# Patient Record
Sex: FEMALE | Race: WHITE | Employment: OTHER | URBAN - METROPOLITAN AREA
[De-identification: names, ages, dates, MRNs, and addresses within clinical notes are randomized per-mention and may not be internally consistent; named-entity substitution may affect disease eponyms.]

---

## 2018-01-01 ENCOUNTER — HOSPITAL ENCOUNTER (EMERGENCY)
Facility: CLINIC | Age: 78
Discharge: HOME | End: 2018-07-11
Attending: EMERGENCY MEDICINE
Payer: MEDICARE

## 2018-01-01 ENCOUNTER — APPOINTMENT (OUTPATIENT)
Dept: GENERAL RADIOLOGY | Facility: CLINIC | Age: 78
End: 2018-01-01
Payer: MEDICARE

## 2018-01-01 ENCOUNTER — HOSPITAL ENCOUNTER (EMERGENCY)
Facility: CLINIC | Age: 78
Discharge: HOME | DRG: 640 | End: 2018-09-07
Attending: EMERGENCY MEDICINE
Payer: MEDICARE

## 2018-01-01 ENCOUNTER — APPOINTMENT (OUTPATIENT)
Dept: CT IMAGING | Facility: CLINIC | Age: 78
End: 2018-01-01
Payer: MEDICARE

## 2018-01-01 ENCOUNTER — HOSPITAL ENCOUNTER (OUTPATIENT)
Age: 78
Setting detail: SPECIMEN
Discharge: HOME | End: 2018-07-23
Payer: MEDICARE

## 2018-01-01 ENCOUNTER — HOSPITAL ENCOUNTER (OUTPATIENT)
Age: 78
Setting detail: SPECIMEN
Discharge: HOME | End: 2018-06-08
Payer: MEDICARE

## 2018-01-01 ENCOUNTER — APPOINTMENT (OUTPATIENT)
Dept: CT IMAGING | Age: 78
End: 2018-01-01
Payer: MEDICARE

## 2018-01-01 ENCOUNTER — HOSPITAL ENCOUNTER (EMERGENCY)
Age: 78
Discharge: HOME | End: 2018-06-30
Attending: EMERGENCY MEDICINE
Payer: MEDICARE

## 2018-01-01 ENCOUNTER — HOSPITAL ENCOUNTER (INPATIENT)
Age: 78
LOS: 4 days | Discharge: NURSING, REHAB OR LONG-TERM CARE | DRG: 640 | End: 2018-09-12
Admitting: FAMILY MEDICINE
Payer: MEDICARE

## 2018-01-01 VITALS
HEART RATE: 57 BPM | OXYGEN SATURATION: 96 % | DIASTOLIC BLOOD PRESSURE: 95 MMHG | WEIGHT: 133 LBS | HEIGHT: 67 IN | TEMPERATURE: 98.8 F | BODY MASS INDEX: 20.88 KG/M2 | SYSTOLIC BLOOD PRESSURE: 187 MMHG | RESPIRATION RATE: 22 BRPM

## 2018-01-01 VITALS
DIASTOLIC BLOOD PRESSURE: 91 MMHG | WEIGHT: 130 LBS | BODY MASS INDEX: 20.4 KG/M2 | SYSTOLIC BLOOD PRESSURE: 164 MMHG | HEIGHT: 67 IN | OXYGEN SATURATION: 99 % | RESPIRATION RATE: 22 BRPM | HEART RATE: 62 BPM | TEMPERATURE: 98 F

## 2018-01-01 VITALS
TEMPERATURE: 98.1 F | SYSTOLIC BLOOD PRESSURE: 155 MMHG | WEIGHT: 120.9 LBS | OXYGEN SATURATION: 98 % | HEIGHT: 67 IN | RESPIRATION RATE: 14 BRPM | HEART RATE: 73 BPM | BODY MASS INDEX: 18.98 KG/M2 | DIASTOLIC BLOOD PRESSURE: 81 MMHG

## 2018-01-01 VITALS
RESPIRATION RATE: 16 BRPM | WEIGHT: 130 LBS | OXYGEN SATURATION: 95 % | HEART RATE: 64 BPM | BODY MASS INDEX: 20.4 KG/M2 | DIASTOLIC BLOOD PRESSURE: 91 MMHG | TEMPERATURE: 98.2 F | SYSTOLIC BLOOD PRESSURE: 154 MMHG | HEIGHT: 67 IN

## 2018-01-01 DIAGNOSIS — E86.0 DEHYDRATION: Primary | ICD-10-CM

## 2018-01-01 DIAGNOSIS — T73.3XXA FATIGUE DUE TO EXCESSIVE EXERTION, INITIAL ENCOUNTER: Primary | ICD-10-CM

## 2018-01-01 DIAGNOSIS — R53.1 GENERAL WEAKNESS: ICD-10-CM

## 2018-01-01 DIAGNOSIS — E86.0 DEHYDRATION: ICD-10-CM

## 2018-01-01 DIAGNOSIS — N34.2 URETHRITIS: ICD-10-CM

## 2018-01-01 DIAGNOSIS — R41.82 ALTERED MENTAL STATUS, UNSPECIFIED ALTERED MENTAL STATUS TYPE: Primary | ICD-10-CM

## 2018-01-01 DIAGNOSIS — N39.0 URINARY TRACT INFECTION WITHOUT HEMATURIA, SITE UNSPECIFIED: Primary | ICD-10-CM

## 2018-01-01 LAB
-: ABNORMAL
-: ABNORMAL
ABSOLUTE EOS #: 0.2 K/UL (ref 0–0.4)
ABSOLUTE EOS #: 0.3 K/UL (ref 0–0.4)
ABSOLUTE EOS #: 0.3 K/UL (ref 0–0.4)
ABSOLUTE EOS #: 0.4 K/UL (ref 0–0.4)
ABSOLUTE EOS #: 0.4 K/UL (ref 0–0.4)
ABSOLUTE IMMATURE GRANULOCYTE: ABNORMAL K/UL (ref 0–0.3)
ABSOLUTE LYMPH #: 0.7 K/UL (ref 1–4.8)
ABSOLUTE LYMPH #: 1 K/UL (ref 1–4.8)
ABSOLUTE LYMPH #: 1 K/UL (ref 1–4.8)
ABSOLUTE LYMPH #: 1.1 K/UL (ref 1–4.8)
ABSOLUTE LYMPH #: 1.3 K/UL (ref 1–4.8)
ABSOLUTE MONO #: 0.3 K/UL (ref 0.1–1.2)
ABSOLUTE MONO #: 0.3 K/UL (ref 0.1–1.2)
ABSOLUTE MONO #: 0.3 K/UL (ref 0.2–0.8)
ABSOLUTE MONO #: 0.3 K/UL (ref 0.2–0.8)
ABSOLUTE MONO #: 0.4 K/UL (ref 0.2–0.8)
AMMONIA: 57 UMOL/L (ref 11–51)
AMORPHOUS: ABNORMAL
AMORPHOUS: ABNORMAL
AMPHETAMINE SCREEN URINE: NEGATIVE
ANION GAP SERPL CALCULATED.3IONS-SCNC: 10 MMOL/L (ref 9–17)
ANION GAP SERPL CALCULATED.3IONS-SCNC: 12 MMOL/L (ref 9–17)
ANION GAP SERPL CALCULATED.3IONS-SCNC: 13 MMOL/L (ref 9–17)
ANION GAP SERPL CALCULATED.3IONS-SCNC: 14 MMOL/L (ref 9–17)
ANION GAP SERPL CALCULATED.3IONS-SCNC: 14 MMOL/L (ref 9–17)
BACTERIA: ABNORMAL
BACTERIA: ABNORMAL
BARBITURATE SCREEN URINE: NEGATIVE
BASOPHILS # BLD: 1 % (ref 0–2)
BASOPHILS ABSOLUTE: 0 K/UL (ref 0–0.2)
BASOPHILS ABSOLUTE: 0.1 K/UL (ref 0–0.2)
BENZODIAZEPINE SCREEN, URINE: NEGATIVE
BILIRUBIN URINE: NEGATIVE
BUN BLDV-MCNC: 11 MG/DL (ref 8–23)
BUN BLDV-MCNC: 17 MG/DL (ref 8–23)
BUN BLDV-MCNC: 21 MG/DL (ref 8–23)
BUN BLDV-MCNC: 22 MG/DL (ref 8–23)
BUN BLDV-MCNC: 22 MG/DL (ref 8–23)
BUN BLDV-MCNC: 30 MG/DL (ref 8–23)
BUN BLDV-MCNC: 31 MG/DL (ref 8–23)
BUN/CREAT BLD: 10 (ref 9–20)
BUN/CREAT BLD: 16 (ref 9–20)
BUN/CREAT BLD: 18 (ref 9–20)
BUN/CREAT BLD: 20 (ref 9–20)
BUN/CREAT BLD: ABNORMAL (ref 9–20)
BUPRENORPHINE URINE: NORMAL
CALCIUM SERPL-MCNC: 9 MG/DL (ref 8.6–10.4)
CALCIUM SERPL-MCNC: 9.1 MG/DL (ref 8.6–10.4)
CALCIUM SERPL-MCNC: 9.2 MG/DL (ref 8.6–10.4)
CALCIUM SERPL-MCNC: 9.3 MG/DL (ref 8.6–10.4)
CALCIUM SERPL-MCNC: 9.3 MG/DL (ref 8.6–10.4)
CALCIUM SERPL-MCNC: 9.4 MG/DL (ref 8.6–10.4)
CALCIUM SERPL-MCNC: 9.6 MG/DL (ref 8.6–10.4)
CANNABINOID SCREEN URINE: NEGATIVE
CASTS UA: ABNORMAL /LPF (ref 0–2)
CASTS UA: ABNORMAL /LPF (ref 0–2)
CHLORIDE BLD-SCNC: 101 MMOL/L (ref 98–107)
CHLORIDE BLD-SCNC: 102 MMOL/L (ref 98–107)
CHLORIDE BLD-SCNC: 103 MMOL/L (ref 98–107)
CHLORIDE BLD-SCNC: 103 MMOL/L (ref 98–107)
CHLORIDE BLD-SCNC: 105 MMOL/L (ref 98–107)
CHLORIDE BLD-SCNC: 98 MMOL/L (ref 98–107)
CHLORIDE BLD-SCNC: 99 MMOL/L (ref 98–107)
CO2: 24 MMOL/L (ref 20–31)
CO2: 25 MMOL/L (ref 20–31)
CO2: 27 MMOL/L (ref 20–31)
CO2: 28 MMOL/L (ref 20–31)
COCAINE METABOLITE, URINE: NEGATIVE
COLOR: YELLOW
COMMENT UA: ABNORMAL
COMMENT UA: ABNORMAL
COMMENT UA: NORMAL
CREAT SERPL-MCNC: 1.07 MG/DL (ref 0.5–0.9)
CREAT SERPL-MCNC: 1.15 MG/DL (ref 0.5–0.9)
CREAT SERPL-MCNC: 1.16 MG/DL (ref 0.5–0.9)
CREAT SERPL-MCNC: 1.24 MG/DL (ref 0.5–0.9)
CREAT SERPL-MCNC: 1.3 MG/DL (ref 0.5–0.9)
CREAT SERPL-MCNC: 1.4 MG/DL (ref 0.5–0.9)
CREAT SERPL-MCNC: 1.47 MG/DL (ref 0.5–0.9)
CRYSTALS, UA: ABNORMAL /HPF
CRYSTALS, UA: ABNORMAL /HPF
CULTURE: NORMAL
DIFFERENTIAL TYPE: ABNORMAL
EKG ATRIAL RATE: 60 BPM
EKG ATRIAL RATE: 65 BPM
EKG P AXIS: 81 DEGREES
EKG P AXIS: 84 DEGREES
EKG P-R INTERVAL: 146 MS
EKG P-R INTERVAL: 150 MS
EKG Q-T INTERVAL: 386 MS
EKG Q-T INTERVAL: 396 MS
EKG QRS DURATION: 78 MS
EKG QRS DURATION: 80 MS
EKG QTC CALCULATION (BAZETT): 396 MS
EKG QTC CALCULATION (BAZETT): 401 MS
EKG R AXIS: 33 DEGREES
EKG R AXIS: 43 DEGREES
EKG T AXIS: 76 DEGREES
EKG T AXIS: 88 DEGREES
EKG VENTRICULAR RATE: 60 BPM
EKG VENTRICULAR RATE: 65 BPM
EOSINOPHILS RELATIVE PERCENT: 5 % (ref 1–4)
EOSINOPHILS RELATIVE PERCENT: 6 % (ref 1–4)
EOSINOPHILS RELATIVE PERCENT: 8 % (ref 1–4)
EPITHELIAL CELLS UA: ABNORMAL /HPF (ref 0–5)
EPITHELIAL CELLS UA: ABNORMAL /HPF (ref 0–5)
ETHANOL PERCENT: <0.01 %
ETHANOL: <10 MG/DL
GFR AFRICAN AMERICAN: 42 ML/MIN
GFR AFRICAN AMERICAN: 44 ML/MIN
GFR AFRICAN AMERICAN: 48 ML/MIN
GFR AFRICAN AMERICAN: 51 ML/MIN
GFR AFRICAN AMERICAN: 55 ML/MIN
GFR AFRICAN AMERICAN: 55 ML/MIN
GFR AFRICAN AMERICAN: >60 ML/MIN
GFR NON-AFRICAN AMERICAN: 34 ML/MIN
GFR NON-AFRICAN AMERICAN: 36 ML/MIN
GFR NON-AFRICAN AMERICAN: 40 ML/MIN
GFR NON-AFRICAN AMERICAN: 42 ML/MIN
GFR NON-AFRICAN AMERICAN: 45 ML/MIN
GFR NON-AFRICAN AMERICAN: 46 ML/MIN
GFR NON-AFRICAN AMERICAN: 50 ML/MIN
GFR SERPL CREATININE-BSD FRML MDRD: ABNORMAL ML/MIN/{1.73_M2}
GLUCOSE BLD-MCNC: 100 MG/DL (ref 70–99)
GLUCOSE BLD-MCNC: 108 MG/DL (ref 70–99)
GLUCOSE BLD-MCNC: 111 MG/DL (ref 65–105)
GLUCOSE BLD-MCNC: 117 MG/DL (ref 70–99)
GLUCOSE BLD-MCNC: 123 MG/DL (ref 70–99)
GLUCOSE BLD-MCNC: 74 MG/DL (ref 70–99)
GLUCOSE BLD-MCNC: 92 MG/DL (ref 70–99)
GLUCOSE BLD-MCNC: 97 MG/DL (ref 70–99)
GLUCOSE URINE: NEGATIVE
HCT VFR BLD CALC: 35.8 % (ref 36–46)
HCT VFR BLD CALC: 35.9 % (ref 36–46)
HCT VFR BLD CALC: 36.7 % (ref 36–46)
HCT VFR BLD CALC: 37.9 % (ref 36–46)
HCT VFR BLD CALC: 38.1 % (ref 36.3–47.1)
HCT VFR BLD CALC: 38.4 % (ref 36–46)
HCT VFR BLD CALC: 38.4 % (ref 36–46)
HEMOGLOBIN: 12.1 G/DL (ref 11.9–15.1)
HEMOGLOBIN: 12.3 G/DL (ref 12–16)
HEMOGLOBIN: 12.5 G/DL (ref 12–16)
HEMOGLOBIN: 12.6 G/DL (ref 12–16)
HEMOGLOBIN: 12.8 G/DL (ref 12–16)
HEMOGLOBIN: 12.9 G/DL (ref 12–16)
HEMOGLOBIN: 12.9 G/DL (ref 12–16)
IMMATURE GRANULOCYTES: ABNORMAL %
INR BLD: 1
KETONES, URINE: ABNORMAL
KETONES, URINE: NEGATIVE
KETONES, URINE: NEGATIVE
LACTIC ACID, SEPSIS WHOLE BLOOD: NORMAL MMOL/L (ref 0.5–1.9)
LACTIC ACID, SEPSIS: 1.1 MMOL/L (ref 0.5–1.9)
LACTIC ACID: 0.8 MMOL/L (ref 0.5–2.2)
LACTIC ACID: 1.2 MMOL/L (ref 0.5–2.2)
LEUKOCYTE ESTERASE, URINE: ABNORMAL
LEUKOCYTE ESTERASE, URINE: NEGATIVE
LEUKOCYTE ESTERASE, URINE: NEGATIVE
LIPASE: 128 U/L (ref 13–60)
LIPASE: 296 U/L (ref 13–60)
LYMPHOCYTES # BLD: 15 % (ref 24–44)
LYMPHOCYTES # BLD: 15 % (ref 24–44)
LYMPHOCYTES # BLD: 18 % (ref 24–44)
LYMPHOCYTES # BLD: 25 % (ref 24–44)
LYMPHOCYTES # BLD: 27 % (ref 24–44)
Lab: NORMAL
MAGNESIUM: 1.8 MG/DL (ref 1.6–2.6)
MAGNESIUM: 1.8 MG/DL (ref 1.6–2.6)
MAGNESIUM: 1.9 MG/DL (ref 1.6–2.6)
MCH RBC QN AUTO: 30 PG (ref 25.2–33.5)
MCH RBC QN AUTO: 31.1 PG (ref 26–34)
MCH RBC QN AUTO: 31.7 PG (ref 26–34)
MCH RBC QN AUTO: 31.9 PG (ref 26–34)
MCH RBC QN AUTO: 32 PG (ref 26–34)
MCH RBC QN AUTO: 32.1 PG (ref 26–34)
MCH RBC QN AUTO: 32.9 PG (ref 26–34)
MCHC RBC AUTO-ENTMCNC: 31.8 G/DL (ref 28.4–34.8)
MCHC RBC AUTO-ENTMCNC: 33.5 G/DL (ref 31–37)
MCHC RBC AUTO-ENTMCNC: 33.6 G/DL (ref 31–37)
MCHC RBC AUTO-ENTMCNC: 33.6 G/DL (ref 31–37)
MCHC RBC AUTO-ENTMCNC: 33.8 G/DL (ref 31–37)
MCHC RBC AUTO-ENTMCNC: 34.8 G/DL (ref 31–37)
MCHC RBC AUTO-ENTMCNC: 35.1 G/DL (ref 31–37)
MCV RBC AUTO: 91.8 FL (ref 80–100)
MCV RBC AUTO: 93.1 FL (ref 80–100)
MCV RBC AUTO: 93.6 FL (ref 80–100)
MCV RBC AUTO: 94.3 FL (ref 80–100)
MCV RBC AUTO: 94.3 FL (ref 82.6–102.9)
MCV RBC AUTO: 94.5 FL (ref 80–100)
MCV RBC AUTO: 95.6 FL (ref 80–100)
MDMA URINE: NORMAL
METHADONE SCREEN, URINE: NEGATIVE
METHAMPHETAMINE, URINE: NORMAL
MONOCYTES # BLD: 6 % (ref 1–7)
MONOCYTES # BLD: 6 % (ref 2–11)
MONOCYTES # BLD: 7 % (ref 1–7)
MONOCYTES # BLD: 7 % (ref 1–7)
MONOCYTES # BLD: 7 % (ref 2–11)
MUCUS: ABNORMAL
MUCUS: ABNORMAL
MYOGLOBIN: 86 NG/ML (ref 25–58)
NITRITE, URINE: NEGATIVE
NRBC AUTOMATED: 0 PER 100 WBC
NRBC AUTOMATED: ABNORMAL PER 100 WBC
OPIATES, URINE: NEGATIVE
OTHER OBSERVATIONS UA: ABNORMAL
OTHER OBSERVATIONS UA: ABNORMAL
OXYCODONE SCREEN URINE: NEGATIVE
PDW BLD-RTO: 13.4 % (ref 11.5–14.5)
PDW BLD-RTO: 13.4 % (ref 11.5–14.5)
PDW BLD-RTO: 13.5 % (ref 11.8–14.4)
PDW BLD-RTO: 13.7 % (ref 12.5–15.4)
PDW BLD-RTO: 14.1 % (ref 12.5–15.4)
PDW BLD-RTO: 14.5 % (ref 11.5–14.5)
PDW BLD-RTO: 14.6 % (ref 11.5–14.5)
PH UA: 5.5 (ref 5–8)
PH UA: 6.5 (ref 5–8)
PH UA: 7.5 (ref 5–8)
PHENCYCLIDINE, URINE: NEGATIVE
PLATELET # BLD: 232 K/UL (ref 140–450)
PLATELET # BLD: 234 K/UL (ref 140–450)
PLATELET # BLD: 238 K/UL (ref 138–453)
PLATELET # BLD: 239 K/UL (ref 130–400)
PLATELET # BLD: 244 K/UL (ref 130–400)
PLATELET # BLD: 246 K/UL (ref 130–400)
PLATELET # BLD: 285 K/UL (ref 130–400)
PLATELET ESTIMATE: ABNORMAL
PMV BLD AUTO: 10.2 FL (ref 8.1–13.5)
PMV BLD AUTO: 7.3 FL (ref 6–12)
PMV BLD AUTO: 7.5 FL (ref 6–12)
PMV BLD AUTO: 7.8 FL (ref 6–12)
PMV BLD AUTO: 8.4 FL (ref 6–12)
PMV BLD AUTO: ABNORMAL FL (ref 6–12)
PMV BLD AUTO: ABNORMAL FL (ref 6–12)
POTASSIUM SERPL-SCNC: 3.5 MMOL/L (ref 3.7–5.3)
POTASSIUM SERPL-SCNC: 3.6 MMOL/L (ref 3.7–5.3)
POTASSIUM SERPL-SCNC: 3.7 MMOL/L (ref 3.7–5.3)
POTASSIUM SERPL-SCNC: 4 MMOL/L (ref 3.7–5.3)
POTASSIUM SERPL-SCNC: 4.1 MMOL/L (ref 3.7–5.3)
POTASSIUM SERPL-SCNC: 4.1 MMOL/L (ref 3.7–5.3)
POTASSIUM SERPL-SCNC: 4.2 MMOL/L (ref 3.7–5.3)
PREALBUMIN: 25.4 MG/DL (ref 20–40)
PROCALCITONIN: 0.07 NG/ML
PROPOXYPHENE, URINE: NORMAL
PROTEIN UA: ABNORMAL
PROTEIN UA: NEGATIVE
PROTEIN UA: NEGATIVE
PROTHROMBIN TIME: 10.6 SEC (ref 9.7–11.6)
RBC # BLD: 3.82 M/UL (ref 4–5.2)
RBC # BLD: 3.84 M/UL (ref 4–5.2)
RBC # BLD: 3.92 M/UL (ref 4–5.2)
RBC # BLD: 4.01 M/UL (ref 4–5.2)
RBC # BLD: 4.04 M/UL (ref 3.95–5.11)
RBC # BLD: 4.07 M/UL (ref 4–5.2)
RBC # BLD: 4.13 M/UL (ref 4–5.2)
RBC # BLD: ABNORMAL 10*6/UL
RBC UA: ABNORMAL /HPF (ref 0–2)
RBC UA: ABNORMAL /HPF (ref 0–2)
RENAL EPITHELIAL, UA: ABNORMAL /HPF
RENAL EPITHELIAL, UA: ABNORMAL /HPF
SEG NEUTROPHILS: 60 % (ref 36–66)
SEG NEUTROPHILS: 61 % (ref 36–66)
SEG NEUTROPHILS: 67 % (ref 36–66)
SEG NEUTROPHILS: 71 % (ref 36–66)
SEG NEUTROPHILS: 72 % (ref 36–66)
SEGMENTED NEUTROPHILS ABSOLUTE COUNT: 2.5 K/UL (ref 1.8–7.7)
SEGMENTED NEUTROPHILS ABSOLUTE COUNT: 3.2 K/UL (ref 1.8–7.7)
SEGMENTED NEUTROPHILS ABSOLUTE COUNT: 3.3 K/UL (ref 1.8–7.7)
SEGMENTED NEUTROPHILS ABSOLUTE COUNT: 3.6 K/UL (ref 1.8–7.7)
SEGMENTED NEUTROPHILS ABSOLUTE COUNT: 4.8 K/UL (ref 1.8–7.7)
SODIUM BLD-SCNC: 137 MMOL/L (ref 135–144)
SODIUM BLD-SCNC: 138 MMOL/L (ref 135–144)
SODIUM BLD-SCNC: 138 MMOL/L (ref 135–144)
SODIUM BLD-SCNC: 141 MMOL/L (ref 135–144)
SODIUM BLD-SCNC: 143 MMOL/L (ref 135–144)
SODIUM BLD-SCNC: 143 MMOL/L (ref 135–144)
SODIUM BLD-SCNC: 145 MMOL/L (ref 135–144)
SPECIFIC GRAVITY UA: 1.01 (ref 1–1.03)
SPECIFIC GRAVITY UA: 1.01 (ref 1–1.03)
SPECIFIC GRAVITY UA: 1.02 (ref 1–1.03)
SPECIMEN DESCRIPTION: NORMAL
STATUS: NORMAL
T4 TOTAL: 6.8 UG/DL (ref 4.5–12)
TEST INFORMATION: NORMAL
TRICHOMONAS: ABNORMAL
TRICHOMONAS: ABNORMAL
TRICYCLIC ANTIDEPRESSANTS, UR: NORMAL
TROPONIN INTERP: ABNORMAL
TROPONIN INTERP: NORMAL
TROPONIN T: <0.03 NG/ML
TROPONIN T: <0.03 NG/ML
TSH SERPL DL<=0.05 MIU/L-ACNC: 2.43 MIU/L (ref 0.3–5)
TURBIDITY: CLEAR
URINE HGB: NEGATIVE
UROBILINOGEN, URINE: NORMAL
VITAMIN D 25-HYDROXY: 37.7 NG/ML (ref 30–100)
VITAMIN D 25-HYDROXY: 38.2 NG/ML (ref 30–100)
WBC # BLD: 4.1 K/UL (ref 3.5–11)
WBC # BLD: 4.2 K/UL (ref 3.5–11.3)
WBC # BLD: 4.6 K/UL (ref 3.5–11)
WBC # BLD: 4.7 K/UL (ref 3.5–11)
WBC # BLD: 5.1 K/UL (ref 3.5–11)
WBC # BLD: 5.5 K/UL (ref 3.5–11)
WBC # BLD: 6.6 K/UL (ref 3.5–11)
WBC # BLD: ABNORMAL 10*3/UL
WBC UA: ABNORMAL /HPF (ref 0–5)
WBC UA: ABNORMAL /HPF (ref 0–5)
YEAST: ABNORMAL
YEAST: ABNORMAL

## 2018-01-01 PROCEDURE — 70450 CT HEAD/BRAIN W/O DYE: CPT

## 2018-01-01 PROCEDURE — 96360 HYDRATION IV INFUSION INIT: CPT

## 2018-01-01 PROCEDURE — 85025 COMPLETE CBC W/AUTO DIFF WBC: CPT

## 2018-01-01 PROCEDURE — 2500000003 HC RX 250 WO HCPCS: Performed by: INTERNAL MEDICINE

## 2018-01-01 PROCEDURE — 1200000000 HC SEMI PRIVATE

## 2018-01-01 PROCEDURE — 97110 THERAPEUTIC EXERCISES: CPT

## 2018-01-01 PROCEDURE — P9603 ONE-WAY ALLOW PRORATED MILES: HCPCS

## 2018-01-01 PROCEDURE — 82306 VITAMIN D 25 HYDROXY: CPT

## 2018-01-01 PROCEDURE — G0378 HOSPITAL OBSERVATION PER HR: HCPCS

## 2018-01-01 PROCEDURE — 6370000000 HC RX 637 (ALT 250 FOR IP): Performed by: NURSE PRACTITIONER

## 2018-01-01 PROCEDURE — 81001 URINALYSIS AUTO W/SCOPE: CPT

## 2018-01-01 PROCEDURE — 97530 THERAPEUTIC ACTIVITIES: CPT

## 2018-01-01 PROCEDURE — 83690 ASSAY OF LIPASE: CPT

## 2018-01-01 PROCEDURE — 6360000002 HC RX W HCPCS: Performed by: NURSE PRACTITIONER

## 2018-01-01 PROCEDURE — 2500000003 HC RX 250 WO HCPCS: Performed by: NURSE PRACTITIONER

## 2018-01-01 PROCEDURE — 6370000000 HC RX 637 (ALT 250 FOR IP): Performed by: INTERNAL MEDICINE

## 2018-01-01 PROCEDURE — 2580000003 HC RX 258: Performed by: NURSE PRACTITIONER

## 2018-01-01 PROCEDURE — 97167 OT EVAL HIGH COMPLEX 60 MIN: CPT

## 2018-01-01 PROCEDURE — 99223 1ST HOSP IP/OBS HIGH 75: CPT | Performed by: NURSE PRACTITIONER

## 2018-01-01 PROCEDURE — 84484 ASSAY OF TROPONIN QUANT: CPT

## 2018-01-01 PROCEDURE — 97162 PT EVAL MOD COMPLEX 30 MIN: CPT

## 2018-01-01 PROCEDURE — 97116 GAIT TRAINING THERAPY: CPT

## 2018-01-01 PROCEDURE — 99232 SBSQ HOSP IP/OBS MODERATE 35: CPT | Performed by: INTERNAL MEDICINE

## 2018-01-01 PROCEDURE — 99232 SBSQ HOSP IP/OBS MODERATE 35: CPT | Performed by: FAMILY MEDICINE

## 2018-01-01 PROCEDURE — 83874 ASSAY OF MYOGLOBIN: CPT

## 2018-01-01 PROCEDURE — 80048 BASIC METABOLIC PNL TOTAL CA: CPT

## 2018-01-01 PROCEDURE — 36415 COLL VENOUS BLD VENIPUNCTURE: CPT

## 2018-01-01 PROCEDURE — 93005 ELECTROCARDIOGRAM TRACING: CPT

## 2018-01-01 PROCEDURE — 90792 PSYCH DIAG EVAL W/MED SRVCS: CPT | Performed by: NURSE PRACTITIONER

## 2018-01-01 PROCEDURE — 84436 ASSAY OF TOTAL THYROXINE: CPT

## 2018-01-01 PROCEDURE — 85610 PROTHROMBIN TIME: CPT

## 2018-01-01 PROCEDURE — G8988 SELF CARE GOAL STATUS: HCPCS

## 2018-01-01 PROCEDURE — 99284 EMERGENCY DEPT VISIT MOD MDM: CPT

## 2018-01-01 PROCEDURE — 83735 ASSAY OF MAGNESIUM: CPT

## 2018-01-01 PROCEDURE — G8978 MOBILITY CURRENT STATUS: HCPCS

## 2018-01-01 PROCEDURE — 81003 URINALYSIS AUTO W/O SCOPE: CPT

## 2018-01-01 PROCEDURE — 99285 EMERGENCY DEPT VISIT HI MDM: CPT

## 2018-01-01 PROCEDURE — G8979 MOBILITY GOAL STATUS: HCPCS

## 2018-01-01 PROCEDURE — 87040 BLOOD CULTURE FOR BACTERIA: CPT

## 2018-01-01 PROCEDURE — 84145 PROCALCITONIN (PCT): CPT

## 2018-01-01 PROCEDURE — 85027 COMPLETE CBC AUTOMATED: CPT

## 2018-01-01 PROCEDURE — 71045 X-RAY EXAM CHEST 1 VIEW: CPT

## 2018-01-01 PROCEDURE — G0480 DRUG TEST DEF 1-7 CLASSES: HCPCS

## 2018-01-01 PROCEDURE — 87086 URINE CULTURE/COLONY COUNT: CPT

## 2018-01-01 PROCEDURE — G8987 SELF CARE CURRENT STATUS: HCPCS

## 2018-01-01 PROCEDURE — 97535 SELF CARE MNGMENT TRAINING: CPT

## 2018-01-01 PROCEDURE — 82947 ASSAY GLUCOSE BLOOD QUANT: CPT

## 2018-01-01 PROCEDURE — 74176 CT ABD & PELVIS W/O CONTRAST: CPT

## 2018-01-01 PROCEDURE — 80307 DRUG TEST PRSMV CHEM ANLYZR: CPT

## 2018-01-01 PROCEDURE — 99239 HOSP IP/OBS DSCHRG MGMT >30: CPT | Performed by: INTERNAL MEDICINE

## 2018-01-01 PROCEDURE — 84443 ASSAY THYROID STIM HORMONE: CPT

## 2018-01-01 PROCEDURE — 83605 ASSAY OF LACTIC ACID: CPT

## 2018-01-01 PROCEDURE — 84134 ASSAY OF PREALBUMIN: CPT

## 2018-01-01 PROCEDURE — 82140 ASSAY OF AMMONIA: CPT

## 2018-01-01 PROCEDURE — 2580000003 HC RX 258

## 2018-01-01 PROCEDURE — 2580000003 HC RX 258: Performed by: EMERGENCY MEDICINE

## 2018-01-01 PROCEDURE — 96361 HYDRATE IV INFUSION ADD-ON: CPT

## 2018-01-01 RX ORDER — LISINOPRIL 5 MG/1
5 TABLET ORAL DAILY
Status: DISCONTINUED | OUTPATIENT
Start: 2018-01-01 | End: 2018-01-01 | Stop reason: HOSPADM

## 2018-01-01 RX ORDER — POTASSIUM CHLORIDE 20 MEQ/1
40 TABLET, EXTENDED RELEASE ORAL PRN
Status: DISCONTINUED | OUTPATIENT
Start: 2018-01-01 | End: 2018-01-01 | Stop reason: HOSPADM

## 2018-01-01 RX ORDER — LANOLIN ALCOHOL/MO/W.PET/CERES
6 CREAM (GRAM) TOPICAL NIGHTLY PRN
Refills: 3 | DISCHARGE
Start: 2018-01-01

## 2018-01-01 RX ORDER — ONDANSETRON 4 MG/1
4 TABLET, ORALLY DISINTEGRATING ORAL EVERY 6 HOURS PRN
Status: DISCONTINUED | OUTPATIENT
Start: 2018-01-01 | End: 2018-01-01 | Stop reason: HOSPADM

## 2018-01-01 RX ORDER — LABETALOL HYDROCHLORIDE 5 MG/ML
10 INJECTION, SOLUTION INTRAVENOUS EVERY 4 HOURS PRN
Status: DISCONTINUED | OUTPATIENT
Start: 2018-01-01 | End: 2018-01-01 | Stop reason: HOSPADM

## 2018-01-01 RX ORDER — ONDANSETRON 4 MG/1
4 TABLET, ORALLY DISINTEGRATING ORAL EVERY 6 HOURS PRN
DISCHARGE
Start: 2018-01-01

## 2018-01-01 RX ORDER — AMOXICILLIN 500 MG/1
500 CAPSULE ORAL 3 TIMES DAILY
Status: ON HOLD | COMMUNITY
End: 2018-01-01 | Stop reason: ALTCHOICE

## 2018-01-01 RX ORDER — 0.9 % SODIUM CHLORIDE 0.9 %
250 INTRAVENOUS SOLUTION INTRAVENOUS ONCE
Status: COMPLETED | OUTPATIENT
Start: 2018-01-01 | End: 2018-01-01

## 2018-01-01 RX ORDER — CEPHALEXIN 500 MG/1
500 CAPSULE ORAL 2 TIMES DAILY
Status: ON HOLD | COMMUNITY
End: 2018-01-01 | Stop reason: ALTCHOICE

## 2018-01-01 RX ORDER — NICOTINE 21 MG/24HR
1 PATCH, TRANSDERMAL 24 HOURS TRANSDERMAL DAILY PRN
Status: DISCONTINUED | OUTPATIENT
Start: 2018-01-01 | End: 2018-01-01 | Stop reason: HOSPADM

## 2018-01-01 RX ORDER — ACETAMINOPHEN 325 MG/1
650 TABLET ORAL EVERY 4 HOURS PRN
Qty: 120 TABLET | Refills: 3 | DISCHARGE
Start: 2018-01-01

## 2018-01-01 RX ORDER — PYRIDOSTIGMINE BROMIDE 60 MG/1
60 TABLET ORAL 2 TIMES DAILY
Status: DISCONTINUED | OUTPATIENT
Start: 2018-01-01 | End: 2018-01-01

## 2018-01-01 RX ORDER — PANTOPRAZOLE SODIUM 40 MG/1
40 TABLET, DELAYED RELEASE ORAL
Status: DISCONTINUED | OUTPATIENT
Start: 2018-01-01 | End: 2018-01-01 | Stop reason: HOSPADM

## 2018-01-01 RX ORDER — PYRIDOSTIGMINE BROMIDE 60 MG/1
60 TABLET ORAL EVERY 8 HOURS SCHEDULED
Status: DISCONTINUED | OUTPATIENT
Start: 2018-01-01 | End: 2018-01-01 | Stop reason: HOSPADM

## 2018-01-01 RX ORDER — LORAZEPAM 2 MG/ML
1 INJECTION INTRAMUSCULAR EVERY 6 HOURS PRN
Status: DISCONTINUED | OUTPATIENT
Start: 2018-01-01 | End: 2018-01-01 | Stop reason: HOSPADM

## 2018-01-01 RX ORDER — POTASSIUM CHLORIDE 7.45 MG/ML
10 INJECTION INTRAVENOUS PRN
Status: DISCONTINUED | OUTPATIENT
Start: 2018-01-01 | End: 2018-01-01 | Stop reason: HOSPADM

## 2018-01-01 RX ORDER — CARBIDOPA AND LEVODOPA 25; 100 MG/1; MG/1
1 TABLET, EXTENDED RELEASE ORAL 4 TIMES DAILY
COMMUNITY

## 2018-01-01 RX ORDER — CEPHALEXIN 500 MG/1
500 CAPSULE ORAL 2 TIMES DAILY
Status: DISCONTINUED | OUTPATIENT
Start: 2018-01-01 | End: 2018-01-01

## 2018-01-01 RX ORDER — AMOXICILLIN 500 MG/1
500 CAPSULE ORAL 3 TIMES DAILY
Status: DISCONTINUED | OUTPATIENT
Start: 2018-01-01 | End: 2018-01-01

## 2018-01-01 RX ORDER — CLONAZEPAM 0.5 MG/1
0.25 TABLET ORAL 2 TIMES DAILY PRN
COMMUNITY

## 2018-01-01 RX ORDER — LISINOPRIL 5 MG/1
5 TABLET ORAL DAILY
Qty: 30 TABLET | Refills: 3 | DISCHARGE
Start: 2018-01-01

## 2018-01-01 RX ORDER — CARVEDILOL 6.25 MG/1
6.25 TABLET ORAL 2 TIMES DAILY WITH MEALS
COMMUNITY

## 2018-01-01 RX ORDER — SODIUM CHLORIDE 0.9 % (FLUSH) 0.9 %
10 SYRINGE (ML) INJECTION PRN
Status: DISCONTINUED | OUTPATIENT
Start: 2018-01-01 | End: 2018-01-01 | Stop reason: HOSPADM

## 2018-01-01 RX ORDER — LANOLIN ALCOHOL/MO/W.PET/CERES
6 CREAM (GRAM) TOPICAL NIGHTLY PRN
Status: DISCONTINUED | OUTPATIENT
Start: 2018-01-01 | End: 2018-01-01 | Stop reason: HOSPADM

## 2018-01-01 RX ORDER — CALCIUM CARBONATE/VITAMIN D3 600 MG-10
1 TABLET ORAL 2 TIMES DAILY WITH MEALS
Status: DISCONTINUED | OUTPATIENT
Start: 2018-01-01 | End: 2018-01-01 | Stop reason: HOSPADM

## 2018-01-01 RX ORDER — MAGNESIUM SULFATE 1 G/100ML
1 INJECTION INTRAVENOUS PRN
Status: DISCONTINUED | OUTPATIENT
Start: 2018-01-01 | End: 2018-01-01 | Stop reason: HOSPADM

## 2018-01-01 RX ORDER — ONDANSETRON 2 MG/ML
4 INJECTION INTRAMUSCULAR; INTRAVENOUS EVERY 6 HOURS PRN
Status: DISCONTINUED | OUTPATIENT
Start: 2018-01-01 | End: 2018-01-01 | Stop reason: SDUPTHER

## 2018-01-01 RX ORDER — ACETAMINOPHEN 325 MG/1
650 TABLET ORAL EVERY 4 HOURS PRN
Status: DISCONTINUED | OUTPATIENT
Start: 2018-01-01 | End: 2018-01-01 | Stop reason: HOSPADM

## 2018-01-01 RX ORDER — CARVEDILOL 6.25 MG/1
6.25 TABLET ORAL 2 TIMES DAILY WITH MEALS
Status: DISCONTINUED | OUTPATIENT
Start: 2018-01-01 | End: 2018-01-01 | Stop reason: HOSPADM

## 2018-01-01 RX ORDER — CLONAZEPAM 0.5 MG/1
0.5 TABLET ORAL 2 TIMES DAILY PRN
Status: DISCONTINUED | OUTPATIENT
Start: 2018-01-01 | End: 2018-01-01 | Stop reason: HOSPADM

## 2018-01-01 RX ORDER — MEGESTROL ACETATE 40 MG/ML
200 SUSPENSION ORAL DAILY
Status: DISCONTINUED | OUTPATIENT
Start: 2018-01-01 | End: 2018-01-01 | Stop reason: HOSPADM

## 2018-01-01 RX ORDER — SODIUM CHLORIDE 0.9 % (FLUSH) 0.9 %
10 SYRINGE (ML) INJECTION EVERY 12 HOURS SCHEDULED
Status: DISCONTINUED | OUTPATIENT
Start: 2018-01-01 | End: 2018-01-01 | Stop reason: HOSPADM

## 2018-01-01 RX ORDER — ONDANSETRON 2 MG/ML
4 INJECTION INTRAMUSCULAR; INTRAVENOUS EVERY 6 HOURS PRN
Status: DISCONTINUED | OUTPATIENT
Start: 2018-01-01 | End: 2018-01-01 | Stop reason: HOSPADM

## 2018-01-01 RX ORDER — HYDRALAZINE HYDROCHLORIDE 25 MG/1
25 TABLET, FILM COATED ORAL ONCE
Status: COMPLETED | OUTPATIENT
Start: 2018-01-01 | End: 2018-01-01

## 2018-01-01 RX ORDER — SODIUM CHLORIDE 9 MG/ML
INJECTION, SOLUTION INTRAVENOUS CONTINUOUS
Status: DISCONTINUED | OUTPATIENT
Start: 2018-01-01 | End: 2018-01-01 | Stop reason: HOSPADM

## 2018-01-01 RX ORDER — SODIUM CHLORIDE 9 MG/ML
INJECTION, SOLUTION INTRAVENOUS CONTINUOUS
Status: DISCONTINUED | OUTPATIENT
Start: 2018-01-01 | End: 2018-01-01

## 2018-01-01 RX ORDER — SERTRALINE HYDROCHLORIDE 25 MG/1
25 TABLET, FILM COATED ORAL DAILY
Status: DISCONTINUED | OUTPATIENT
Start: 2018-01-01 | End: 2018-01-01 | Stop reason: HOSPADM

## 2018-01-01 RX ORDER — SERTRALINE HYDROCHLORIDE 25 MG/1
25 TABLET, FILM COATED ORAL DAILY
Qty: 30 TABLET | Refills: 3 | DISCHARGE
Start: 2018-01-01

## 2018-01-01 RX ORDER — LABETALOL HYDROCHLORIDE 5 MG/ML
10 INJECTION, SOLUTION INTRAVENOUS EVERY 6 HOURS PRN
Status: DISCONTINUED | OUTPATIENT
Start: 2018-01-01 | End: 2018-01-01

## 2018-01-01 RX ORDER — POLYETHYLENE GLYCOL 3350 17 G/17G
17 POWDER, FOR SOLUTION ORAL DAILY
Status: DISCONTINUED | OUTPATIENT
Start: 2018-01-01 | End: 2018-01-01 | Stop reason: HOSPADM

## 2018-01-01 RX ORDER — HYDRALAZINE HYDROCHLORIDE 20 MG/ML
10 INJECTION INTRAMUSCULAR; INTRAVENOUS EVERY 6 HOURS PRN
Status: DISCONTINUED | OUTPATIENT
Start: 2018-01-01 | End: 2018-01-01

## 2018-01-01 RX ORDER — SULFAMETHOXAZOLE AND TRIMETHOPRIM 800; 160 MG/1; MG/1
1 TABLET ORAL 2 TIMES DAILY
Qty: 14 TABLET | Refills: 0 | Status: SHIPPED | OUTPATIENT
Start: 2018-01-01 | End: 2018-01-01

## 2018-01-01 RX ORDER — POTASSIUM CHLORIDE 20MEQ/15ML
40 LIQUID (ML) ORAL PRN
Status: DISCONTINUED | OUTPATIENT
Start: 2018-01-01 | End: 2018-01-01 | Stop reason: HOSPADM

## 2018-01-01 RX ORDER — AMLODIPINE BESYLATE 5 MG/1
5 TABLET ORAL DAILY
Status: DISCONTINUED | OUTPATIENT
Start: 2018-01-01 | End: 2018-01-01

## 2018-01-01 RX ORDER — CARBIDOPA AND LEVODOPA 25; 100 MG/1; MG/1
1 TABLET, EXTENDED RELEASE ORAL 4 TIMES DAILY
Status: DISCONTINUED | OUTPATIENT
Start: 2018-01-01 | End: 2018-01-01 | Stop reason: HOSPADM

## 2018-01-01 RX ORDER — CHOLECALCIFEROL (VITAMIN D3) 125 MCG
10 CAPSULE ORAL NIGHTLY
Status: ON HOLD | COMMUNITY
End: 2018-01-01 | Stop reason: HOSPADM

## 2018-01-01 RX ORDER — BISACODYL 10 MG
10 SUPPOSITORY, RECTAL RECTAL DAILY PRN
Status: DISCONTINUED | OUTPATIENT
Start: 2018-01-01 | End: 2018-01-01 | Stop reason: HOSPADM

## 2018-01-01 RX ORDER — MEGESTROL ACETATE 40 MG/ML
200 SUSPENSION ORAL DAILY
Qty: 240 ML | Refills: 3 | DISCHARGE
Start: 2018-01-01

## 2018-01-01 RX ADMIN — ACETAMINOPHEN 650 MG: 325 TABLET ORAL at 04:23

## 2018-01-01 RX ADMIN — LORAZEPAM 1 MG: 2 INJECTION, SOLUTION INTRAMUSCULAR; INTRAVENOUS at 22:07

## 2018-01-01 RX ADMIN — CARBIDOPA AND LEVODOPA 1 TABLET: 25; 100 TABLET, EXTENDED RELEASE ORAL at 17:19

## 2018-01-01 RX ADMIN — AMOXICILLIN 500 MG: 500 CAPSULE ORAL at 22:08

## 2018-01-01 RX ADMIN — AMOXICILLIN 500 MG: 500 CAPSULE ORAL at 15:34

## 2018-01-01 RX ADMIN — SODIUM CHLORIDE 250 ML: 9 INJECTION, SOLUTION INTRAVENOUS at 10:15

## 2018-01-01 RX ADMIN — AMOXICILLIN 500 MG: 500 CAPSULE ORAL at 09:13

## 2018-01-01 RX ADMIN — PANTOPRAZOLE SODIUM 40 MG: 40 TABLET, DELAYED RELEASE ORAL at 10:12

## 2018-01-01 RX ADMIN — PYRIDOSTIGMINE BROMIDE 60 MG: 60 TABLET ORAL at 09:00

## 2018-01-01 RX ADMIN — POLYETHYLENE GLYCOL 3350 17 G: 17 POWDER, FOR SOLUTION ORAL at 10:14

## 2018-01-01 RX ADMIN — Medication 1 TABLET: at 17:19

## 2018-01-01 RX ADMIN — ACETAMINOPHEN 650 MG: 325 TABLET ORAL at 17:59

## 2018-01-01 RX ADMIN — CARBIDOPA AND LEVODOPA 1 TABLET: 25; 100 TABLET, EXTENDED RELEASE ORAL at 17:59

## 2018-01-01 RX ADMIN — PYRIDOSTIGMINE BROMIDE 60 MG: 60 TABLET ORAL at 09:13

## 2018-01-01 RX ADMIN — PYRIDOSTIGMINE BROMIDE 60 MG: 60 TABLET ORAL at 20:51

## 2018-01-01 RX ADMIN — SODIUM CHLORIDE: 9 INJECTION, SOLUTION INTRAVENOUS at 03:00

## 2018-01-01 RX ADMIN — ENOXAPARIN SODIUM 40 MG: 40 INJECTION SUBCUTANEOUS at 09:13

## 2018-01-01 RX ADMIN — Medication 1 TABLET: at 10:12

## 2018-01-01 RX ADMIN — CARBIDOPA AND LEVODOPA 1 TABLET: 25; 100 TABLET, EXTENDED RELEASE ORAL at 10:16

## 2018-01-01 RX ADMIN — Medication 1 TABLET: at 09:12

## 2018-01-01 RX ADMIN — PANTOPRAZOLE SODIUM 40 MG: 40 TABLET, DELAYED RELEASE ORAL at 06:16

## 2018-01-01 RX ADMIN — CLONAZEPAM 0.5 MG: 0.5 TABLET ORAL at 06:16

## 2018-01-01 RX ADMIN — HYDRALAZINE HYDROCHLORIDE 25 MG: 25 TABLET, FILM COATED ORAL at 01:06

## 2018-01-01 RX ADMIN — SODIUM CHLORIDE: 9 INJECTION, SOLUTION INTRAVENOUS at 22:08

## 2018-01-01 RX ADMIN — SODIUM CHLORIDE: 9 INJECTION, SOLUTION INTRAVENOUS at 20:59

## 2018-01-01 RX ADMIN — PYRIDOSTIGMINE BROMIDE 60 MG: 60 TABLET ORAL at 23:31

## 2018-01-01 RX ADMIN — AMOXICILLIN 500 MG: 500 CAPSULE ORAL at 20:51

## 2018-01-01 RX ADMIN — Medication 10 ML: at 22:08

## 2018-01-01 RX ADMIN — CARBIDOPA AND LEVODOPA 1 TABLET: 25; 100 TABLET, EXTENDED RELEASE ORAL at 22:08

## 2018-01-01 RX ADMIN — CLONAZEPAM 0.5 MG: 0.5 TABLET ORAL at 20:49

## 2018-01-01 RX ADMIN — CARBIDOPA AND LEVODOPA 1 TABLET: 25; 100 TABLET, EXTENDED RELEASE ORAL at 09:13

## 2018-01-01 RX ADMIN — HYDRALAZINE HYDROCHLORIDE 25 MG: 25 TABLET, FILM COATED ORAL at 20:50

## 2018-01-01 RX ADMIN — SODIUM CHLORIDE: 9 INJECTION, SOLUTION INTRAVENOUS at 14:30

## 2018-01-01 RX ADMIN — LABETALOL HYDROCHLORIDE 10 MG: 5 INJECTION INTRAVENOUS at 13:01

## 2018-01-01 RX ADMIN — HYDRALAZINE HYDROCHLORIDE 10 MG: 20 INJECTION INTRAMUSCULAR; INTRAVENOUS at 15:58

## 2018-01-01 RX ADMIN — LABETALOL HYDROCHLORIDE 10 MG: 5 INJECTION INTRAVENOUS at 04:28

## 2018-01-01 RX ADMIN — CARBIDOPA AND LEVODOPA 1 TABLET: 25; 100 TABLET, EXTENDED RELEASE ORAL at 14:30

## 2018-01-01 RX ADMIN — PYRIDOSTIGMINE BROMIDE 60 MG: 60 TABLET ORAL at 03:31

## 2018-01-01 RX ADMIN — CARBIDOPA AND LEVODOPA 1 TABLET: 25; 100 TABLET, EXTENDED RELEASE ORAL at 03:00

## 2018-01-01 RX ADMIN — MEGESTROL ACETATE 200 MG: 40 SUSPENSION ORAL at 10:12

## 2018-01-01 RX ADMIN — CLONAZEPAM 0.5 MG: 0.5 TABLET ORAL at 01:47

## 2018-01-01 RX ADMIN — SODIUM CHLORIDE: 9 INJECTION, SOLUTION INTRAVENOUS at 12:16

## 2018-01-01 RX ADMIN — LABETALOL HYDROCHLORIDE 10 MG: 5 INJECTION, SOLUTION INTRAVENOUS at 13:34

## 2018-01-01 RX ADMIN — POLYETHYLENE GLYCOL 3350 17 G: 17 POWDER, FOR SOLUTION ORAL at 09:13

## 2018-01-01 RX ADMIN — Medication 1 TABLET: at 18:22

## 2018-01-01 RX ADMIN — CARBIDOPA AND LEVODOPA 1 TABLET: 25; 100 TABLET, EXTENDED RELEASE ORAL at 18:23

## 2018-01-01 RX ADMIN — LISINOPRIL 5 MG: 5 TABLET ORAL at 11:08

## 2018-01-01 RX ADMIN — PANTOPRAZOLE SODIUM 40 MG: 40 TABLET, DELAYED RELEASE ORAL at 06:37

## 2018-01-01 RX ADMIN — CARBIDOPA AND LEVODOPA 1 TABLET: 25; 100 TABLET, EXTENDED RELEASE ORAL at 15:34

## 2018-01-01 RX ADMIN — AMOXICILLIN 500 MG: 500 CAPSULE ORAL at 14:24

## 2018-01-01 RX ADMIN — ENOXAPARIN SODIUM 40 MG: 40 INJECTION SUBCUTANEOUS at 13:00

## 2018-01-01 RX ADMIN — LORAZEPAM 1 MG: 2 INJECTION, SOLUTION INTRAMUSCULAR; INTRAVENOUS at 06:50

## 2018-01-01 RX ADMIN — AMLODIPINE BESYLATE 5 MG: 5 TABLET ORAL at 11:08

## 2018-01-01 RX ADMIN — SODIUM CHLORIDE: 9 INJECTION, SOLUTION INTRAVENOUS at 01:17

## 2018-01-01 RX ADMIN — MEGESTROL ACETATE 200 MG: 40 SUSPENSION ORAL at 18:23

## 2018-01-01 RX ADMIN — CARBIDOPA AND LEVODOPA 1 TABLET: 25; 100 TABLET, EXTENDED RELEASE ORAL at 14:49

## 2018-01-01 RX ADMIN — CARBIDOPA AND LEVODOPA 1 TABLET: 25; 100 TABLET, EXTENDED RELEASE ORAL at 21:11

## 2018-01-01 RX ADMIN — Medication 1 TABLET: at 08:00

## 2018-01-01 RX ADMIN — LABETALOL HYDROCHLORIDE 10 MG: 5 INJECTION, SOLUTION INTRAVENOUS at 22:08

## 2018-01-01 RX ADMIN — ENOXAPARIN SODIUM 40 MG: 40 INJECTION SUBCUTANEOUS at 10:23

## 2018-01-01 RX ADMIN — LABETALOL HYDROCHLORIDE 10 MG: 5 INJECTION INTRAVENOUS at 08:18

## 2018-01-01 RX ADMIN — AMOXICILLIN 500 MG: 500 CAPSULE ORAL at 09:00

## 2018-01-01 RX ADMIN — Medication 1 TABLET: at 17:59

## 2018-01-01 RX ADMIN — CARBIDOPA AND LEVODOPA 1 TABLET: 25; 100 TABLET, EXTENDED RELEASE ORAL at 13:33

## 2018-01-01 RX ADMIN — CARBIDOPA AND LEVODOPA 1 TABLET: 25; 100 TABLET, EXTENDED RELEASE ORAL at 09:28

## 2018-01-01 RX ADMIN — ENOXAPARIN SODIUM 40 MG: 40 INJECTION SUBCUTANEOUS at 09:28

## 2018-01-01 RX ADMIN — POLYETHYLENE GLYCOL 3350 17 G: 17 POWDER, FOR SOLUTION ORAL at 09:28

## 2018-01-01 RX ADMIN — AMOXICILLIN 500 MG: 500 CAPSULE ORAL at 13:34

## 2018-01-01 ASSESSMENT — PAIN SCALES - GENERAL
PAINLEVEL_OUTOF10: 5
PAINLEVEL_OUTOF10: 0
PAINLEVEL_OUTOF10: 2
PAINLEVEL_OUTOF10: 5
PAINLEVEL_OUTOF10: 0

## 2018-01-01 ASSESSMENT — PAIN DESCRIPTION - DESCRIPTORS: DESCRIPTORS: ACHING

## 2018-01-01 ASSESSMENT — ENCOUNTER SYMPTOMS
DIARRHEA: 1
SHORTNESS OF BREATH: 1
BACK PAIN: 1
ABDOMINAL DISTENTION: 1
PHOTOPHOBIA: 0
NAUSEA: 1
SORE THROAT: 1
ABDOMINAL PAIN: 1

## 2018-01-01 ASSESSMENT — PAIN DESCRIPTION - ORIENTATION: ORIENTATION: LOWER

## 2018-01-01 ASSESSMENT — PAIN DESCRIPTION - LOCATION
LOCATION: HEAD
LOCATION: ABDOMEN

## 2018-01-01 ASSESSMENT — PAIN DESCRIPTION - PAIN TYPE: TYPE: ACUTE PAIN

## 2018-01-01 ASSESSMENT — PAIN DESCRIPTION - FREQUENCY: FREQUENCY: INTERMITTENT

## 2018-03-05 PROBLEM — I95.1 ORTHOSTATIC HYPOTENSION: Status: ACTIVE | Noted: 2018-01-01

## 2018-03-05 PROBLEM — G20 PARKINSON'S DISEASE (HCC): Status: ACTIVE | Noted: 2018-01-01

## 2018-06-30 NOTE — ED NOTES
Bed: 22  Expected date: 6/30/18  Expected time:   Means of arrival:   Comments:  [address] [facility]  06/30/18 8166

## 2018-07-02 PROBLEM — F41.9 ANXIETY: Status: ACTIVE | Noted: 2018-01-01

## 2018-07-11 NOTE — ED NOTES
Dr Ivan Galarza at bedside to evaluate.       Radha Lilly, CARMENCITA  07/11/18 6378
about drug interactions and the potential side effects of antibiotics.  The doctor should be told immediately if a patient has any side effects from antibiotics  Page last updated: February 24, 2017 Content source:   [employer] [employer] Disease Control and [employer] for Emerging and Zoonotic Infectious Diseases ([facility])  [address] [place] (QP)        Jossue Zacarias RN  07/11/18 4142

## 2018-07-11 NOTE — ED PROVIDER NOTES
[facility]  [address] [facility] [address]  Phone: [phone]        Pt Name: Dayo Be  MRN: [id number]  [place] 1940  Date of evaluation: 7/11/18      CHIEF COMPLAINT       Chief Complaint   Patient presents with    Constipation     3 days, \"I tried to go but nothing and I did enema 3 days ago and it worked but I tried enema again yesterday and nothing\"    Abdominal Pain     lower abdomen 2 days, c/o constipation \"full feeling in there not really pain\"    Dysuria     \"feels hot when I go\", today, denies pain but states she had a UTI in the past, pt vague with her symptoms         HISTORY OF PRESENT ILLNESS  (Location/Symptom, Timing/Onset, Context/Setting, Quality, Duration, Modifying Factors, Severity.)    Dayo Be is a 68 y.o. female who presents With a possible urinary tract infection. The patient states over the last few days she's noted that she is felt a little more weak than normal.  She's had some urinary frequency and some abdominal discomfort. Patient states that nothing makes her symptoms better or worse. She is concerned about possibly being constipated. She states the stool. She has had have been harder than normal.  She is still tolerating by mouth intake. She denies any headache no chest pain no shortness of breath no fever no cough. The patient states that last time she had this she was admitted to the hospital for 5 days and she is refusing to be admitted to the hospital.  States that's why she came to a freestanding emergency department because she does not want to be admitted       REVIEW OF SYSTEMS    (2-9 systems for level 4, 10 or more for level 5)     Review of Systems   Gastrointestinal: Positive for abdominal pain. Genitourinary: Positive for dysuria. Neurological: Positive for weakness. All other systems reviewed and are negative.       PAST MEDICAL HISTORY    has a past medical history of Hypertension and Parkinson disease supple. No JVD present. No tracheal deviation present. Cardiovascular: Normal rate, regular rhythm and normal heart sounds. Pulmonary/Chest: Effort normal and breath sounds normal. No stridor. No respiratory distress. She has no wheezes. She has no rales. Abdominal: Soft. Bowel sounds are normal. She exhibits no distension and no mass. There is no tenderness. There is no rebound and no guarding. Musculoskeletal: Normal range of motion. She exhibits no edema or tenderness. Lymphadenopathy:     She has no cervical adenopathy. Neurological: She is alert. No focal deficits appreciated with a GCS of 15   Skin: Skin is warm and dry. No rash noted. Psychiatric: She has a normal mood and affect. Nursing note and vitals reviewed. DIFFERENTIAL DIAGNOSIS/ MDM:     The patient's vague complaint. I will get an EKG, UA labs, CT of the abdomen and pelvis    DIAGNOSTIC RESULTS     EKG: All EKG's are interpreted by the Emergency Department Physician who either signs or Co-signs this chart in the absence of a cardiologist.      Interpreted by Daya Sanches MD     Rhythm: normal sinus   Rate: 60  Axis: 43  Ectopy: none  Conduction: normal  ST Segments: no acute change  T Waves: no acute change  Q Waves: none    Clinical Impression: normal sinus rhythm with no acute changes/normal EKG. No acute infarction/ischemia noted. RADIOLOGY:   Non-plain film images such as CT, Ultrasound and MRI are read by the radiologist. Plain radiographic images are visualized and the radiologist interpretations are reviewed as follows:         Interpretation per the Radiologist below, if available at the time of this note:    CT ABDOMEN PELVIS WO CONTRAST (Preliminary result)   Result time 07/11/18 16:17:27   Preliminary result by Savannah Ovalles MD (07/11/18 16:17:27)                Impression:    No acute CT finding in the abdomen or pelvis on this unenhanced scan.     Small rectal fecal impaction and moderate retained

## 2018-09-07 NOTE — ED NOTES
at [facility] [address] called and left msg regarding nursing care at home.      Talia Zacarias RN  09/07/18 7466

## 2018-09-07 NOTE — ED PROVIDER NOTES
[facility]  [address] [facility] [address]  Phone: [phone]      Pt Name: Eleazar Liang  MRN: [id number]  [place] 1940  Date of evaluation: 9/7/2018      CHIEF COMPLAINT       Chief Complaint   Patient presents with    Fatigue     started this am. all over weakness. hx of parkinsons. HISTORY OF PRESENT ILLNESS    55-year-old female with a history of Parkinson's disease presents to the emergency department today complaining of weakness upon awakening this morning. She reports the weakness is global.  When she has been weak like this in the past, she's had a urinary tract infection. She denies any pain anywhere. No fevers or chills. No urinary hesitancy frequency or dysuria. No abdominal pain. No nausea vomiting or diarrhea. The only complaint that she has is weakness. She tells me that she waited 30 minutes to see if the weakness would pass this morning and it did not and therefore she presents here. She currently is on an antibiotic because she had a dental extraction. He seems to be healing nicely without any issues or concerns for the patient. REVIEW OF SYSTEMS     Review of Systems   All other systems reviewed and are negative. PAST MEDICAL HISTORY    has a past medical history of Hypertension and Parkinson disease ([facility] [address] SURGICAL HISTORY      has no past surgical history on file.     CURRENT MEDICATIONS       Current Discharge Medication List      CONTINUE these medications which have NOT CHANGED    Details   amoxicillin (AMOXIL) 500 MG capsule Take 500 mg by mouth 3 times daily      clonazePAM (KLONOPIN) 0.25 MG disintegrating tablet       polyethylene glycol (GLYCOLAX) powder Take 17 g by mouth daily  Qty: 500 g, Refills: 0    Associated Diagnoses: Constipation, unspecified constipation type      pyridostigmine (MESTINON) 60 MG tablet 60 mg 2 times daily       Calcium Carbonate-Vitamin D (CALCIUM-VITAMIN D) 500-200 MG-UNIT per tablet Take 1 tablet by mouth 2 Height: 5' 7\" (1.702 m)      -------------------------  BP: (!) 154/91, Temp: 98.2 °F (36.8 °C), Pulse: 64, Resp: 16      Re-evaluation Notes  After IV hydration patient feels better and looks better according to her . I have attempted to ambulate her here unsuccessfully. I told her with this in mind I do not feel safe letting her go home and I would like her to be admitted to the hospital.  Patient is refusing. She has occupational set up today and again she thinks that she would be fine at home. I do not find anything serious or life threatening going on currently. My biggest concern is her ability to get up and ambulate at home. I have discussed the risk and benefits and the presence of her  and she will be discharged. CONSULTS:  None    CRITICAL CARE:   None    PROCEDURES:  None    FINAL IMPRESSION      1. Dehydration    2.  General weakness          DISPOSITION/PLAN   DISPOSITION Decision To Discharge 09/07/2018 11:18:08 AM      Condition on Disposition  Fair    PATIENT REFERRED TO:  Tony Aguliera 647     Schedule an appointment as soon as possible for a visit in 3 days        DISCHARGE MEDICATIONS:  Current Discharge Medication List          (Please note that portions of this note were completed with a voice recognition program.  Efforts were made to edit the dictations but occasionally words are mis-transcribed.)    Shaheen Ibrahim MD, F.A.C.E.P, F.A.A.E.M  Emergency Physician Attending          Shaheen Ibrahim MD  09/07/18 99

## 2018-09-08 PROBLEM — E86.0 DEHYDRATION: Status: ACTIVE | Noted: 2018-01-01

## 2018-09-08 PROBLEM — R53.82 CHRONIC FATIGUE: Status: ACTIVE | Noted: 2018-01-01

## 2018-09-09 PROBLEM — N18.30 CKD (CHRONIC KIDNEY DISEASE) STAGE 3, GFR 30-59 ML/MIN (HCC): Status: ACTIVE | Noted: 2018-01-01

## 2018-09-09 PROBLEM — E43 SEVERE MALNUTRITION (HCC): Chronic | Status: ACTIVE | Noted: 2018-01-01

## 2018-09-09 NOTE — ED PROVIDER NOTES
exhibits no discharge. Left eye exhibits no discharge. No scleral icterus. Neck: Neck supple. No JVD present. No tracheal deviation present. No thyromegaly present. Cardiovascular: Normal rate and regular rhythm. Pulmonary/Chest: Effort normal and breath sounds normal.   Abdominal: Soft. Bowel sounds are normal. She exhibits distension. There is tenderness. Musculoskeletal: Normal range of motion. She exhibits edema and tenderness. Lymphadenopathy:     She has no cervical adenopathy. Neurological: She is alert and oriented to person, place, and time. Skin: Skin is warm and dry. Psychiatric: She has a normal mood and affect. Her behavior is normal.   Nursing note and vitals reviewed. DIAGNOSTIC RESULTS     EKG: All EKG's are interpreted by the Emergency Department Physician who either signs or Co-signs this chart in the absence of a cardiologist.        RADIOLOGY:   Non-plain film images such as CT, Ultrasound and MRI are read by the radiologist. Plain radiographic images are visualized and preliminarily interpreted by the emergency physician with the below findings:      No results found.   Interpretation per the Radiologist below, if available at the time of this note:    No orders to display         ED BEDSIDE ULTRASOUND:   Performed by ED Physician - none    LABS:  Labs Reviewed   LIPASE - Abnormal; Notable for the following:        Result Value    Lipase 296 (*)     All other components within normal limits   BASIC METABOLIC PANEL - Abnormal; Notable for the following:     Glucose 108 (*)     CREATININE 1.16 (*)     GFR Non- 45 (*)     GFR  55 (*)     All other components within normal limits   CBC WITH AUTO DIFFERENTIAL - Abnormal; Notable for the following:     RBC 3.84 (*)     RDW 14.6 (*)     Seg Neutrophils 71 (*)     Lymphocytes 15 (*)     Eosinophils % 6 (*)     Absolute Lymph # 0.70 (*)     All other components within normal limits   TROP/MYOGLOBIN - Abnormal; Notable for the following:     Myoglobin 86 (*)     All other components within normal limits   LACTATE, SEPSIS   MAGNESIUM   URINE RT REFLEX TO CULTURE       All other labs were within normal range or not returned as of this dictation. EMERGENCY DEPARTMENT COURSE and DIFFERENTIAL DIAGNOSIS/MDM:   Vitals:    Vitals:    09/08/18 2027   BP: (!) 165/85   Pulse: 66   Resp: 17   Temp: 97.8 °F (36.6 °C)   TempSrc: Oral   SpO2: 98%   Weight: 120 lb (54.4 kg)   Height: 5' 7\" (1.702 m)     Patient's admitted to the hospital for nursing home placement. CONSULTS:  None    PROCEDURES:  Not clinically indicated. FINAL IMPRESSION      1. Fatigue due to excessive exertion, initial encounter    2. General weakness    3. Dehydration          DISPOSITION/PLAN   DISPOSITION Decision To Admit 09/08/2018 10:42:54 PM      PATIENT REFERRED TO:   No follow-up provider specified.     DISCHARGE MEDICATIONS:     New Prescriptions    No medications on file           (Please note that portions of this note were completed with a voice recognition program.  Efforts were made to edit the dictations but occasionally words are mis-transcribed.)    Dakota Chang MD  Attending Emergency Physician           Dakota Chang MD  09/08/18 4148

## 2018-09-09 NOTE — PROGRESS NOTES
Physical Therapy    Facility/Department: [facility]  Initial Assessment    NAME: Rupinder Rm  : 1940  MRN: [id number]    Date of Service: 2018    Discharge Recommendations:  ECF with PT, Subacute/Skilled Nursing Facility   PT Equipment Recommendations  Equipment Needed: No    Patient Diagnosis(es): The primary encounter diagnosis was Fatigue due to excessive exertion, initial encounter. Diagnoses of General weakness and Dehydration were also pertinent to this visit. has a past medical history of Hypertension and Parkinson disease ([facility] [address] has no past surgical history on file. Restrictions     Vision/Hearing  Vision: Impaired  Vision Exceptions: Wears glasses for distance  Hearing: Exceptions to [facility]  Hearing Exceptions: Hard of hearing/hearing concerns     Subjective  General  Chart Reviewed: Yes  Patient assessed for rehabilitation services?: Yes  Response To Previous Treatment: Not applicable  Family / Caregiver Present: No  Follows Commands: Within Functional Limits  Pain Screening  Patient Currently in Pain: Denies  Vital Signs  Patient Currently in Pain: Denies       Orientation  Orientation  Overall Orientation Status: Within Functional Limits (Oriented to month, year, and place, but VC's for redirection to mobility tasks.)    Social/Functional History  Social/Functional History  Lives With: Spouse (Fighting with spouse over phone during eval. Pt had brought spouse's phone to hospital and told him it was \"to be mean for him not helping her and trynig to kill her by not getting her medication or food. \"  Spouse told pt he does not know which hospital.)  Type of Home: [address] [place] [address],[address]: One level  Home Access: Stairs to enter without rails  Entrance Stairs - Number of Steps: 1  Bathroom Shower/Tub: Tub/Shower unit  Bathroom Toilet: Standard  Home Equipment: Rolling walker, Cane (Use cane primarily - both in home and in community)  Receives Help From: Family  Ambulation Assistance: support at home  Exam: LE weakness, difficulty with bed mobility and transfers, unsteadiness with gait  Clinical Presentation: evolving due to recent faitgue and dehydration  Patient Education: PT POC and LE ther ex  Barriers to Learning: lethargic  REQUIRES PT FOLLOW UP: Yes  Activity Tolerance  Activity Tolerance: Patient limited by endurance         Plan   Plan  Times per week: 1-2x/day for 5-6x/wk  Current Treatment Recommendations: Strengthening, Balance Training, Functional Mobility Training, Gait Training, Stair training, Transfer Training, Neuromuscular Re-education, Patient/Caregiver Education & Training  Safety Devices  Type of devices: All fall risk precautions in place, Call light within reach, Chair alarm in place, Left in chair, Patient at risk for falls, Gait belt, Nurse notified  Restraints  Initially in place: No    G-Code  PT G-Codes  Functional Assessment Tool Used: [place] AM-PAC without stair climbing  Score: CK  Functional Limitation: Mobility: Walking and moving around  Mobility: Walking and Moving Around Current Status (): At least 40 percent but less than 60 percent impaired, limited or restricted  Mobility: Walking and Moving Around Goal Status (): At least 1 percent but less than 20 percent impaired, limited or restricted  OutComes Score                                           AM-PAC Score     AM-PAC Inpatient Mobility without Stair Climbing Raw Score : 15  AM-PAC Inpatient without Stair Climbing T-Scale Score : 43.03  Mobility Inpatient CMS 0-100% Score: 47.43  Mobility Inpatient without Stair CMS G-Code Modifier : CK       Goals  Short term goals  Time Frame for Short term goals: 12 visits  Short term goal 1: Pt to be Arthur for bed mobility and transfers with imrpved safe sequencing. Short term goal 2: Pt to amb 150 feet with cane vs RW and SBA with improved upright posture.   Short term goal 3: Pt to amb up/down 1 threshold step with LRAD with SBA if returns to home directly.        Therapy Time   Individual Concurrent Group Co-treatment   Time In 0915         Time Out 1025         Minutes [address], PT

## 2018-09-09 NOTE — PROGRESS NOTES
[facility]    Progress Note    9/9/2018    1:52 PM    Name:   Fang Singh  MRN:     [id number]     Acct:      [de-identified]   Room:   2019/2019-02   Day:  1  Admit Date:  9/8/2018  8:25 PM    PCP:   No primary care provider on file. Code Status:  Full Code    Subjective:     C/C:   Chief Complaint   Patient presents with    Fatigue     Interval History Status: not changed. Patient seems drowsy this morning. Patient eating breakfast with no nausea, vomiting, or coughing. Patient states that she is feeling tired and is not able to take care of her self. Brief History:      67 yo f with h/o parkinson's disease was brought to ED due to generalized weakness. Patient states her generalized weakness has been going on for about year, but continue to be worsening. Patient unable to take care of her self, and as her  does not wish to or cannot take care of the patient any more. Patient called 911 and was brought to ED. Patient was in ED 2 days ago for the same reason. Patient was given IVF and patient felt better. Patient was recommended to be admitted for placement, however, patient refused admission at that time. Patient also reports decreased oral intake last 1 week. Patient looks chronically malnourished. Patient denies nausea, vomiting, dysphagia. Review of Systems:     Constitutional:  negative for chills, fevers, sweats + fatigue, generalized weakness  Respiratory:  negative for cough, dyspnea on exertion, shortness of breath, wheezing  Cardiovascular:  negative for chest pain, chest pressure/discomfort, lower extremity edema, palpitations  Gastrointestinal:  negative for abdominal pain, constipation, diarrhea, nausea, vomiting  Neurological:  negative for dizziness, headache    Medications: Allergies:     Allergies   Allergen Reactions    Oxytrol [Oxybutynin]     Trazodone And Nefazodone        Current Meds:

## 2018-09-09 NOTE — PLAN OF CARE
Problem: Nutrition  Goal: Optimal nutrition therapy  Outcome: Ongoing  Nutrition Problem: Inadequate oral intake  Intervention: Food and/or Nutrient Delivery: Continue current diet, Start ONS  Nutritional Goals: PO intake >75% of meals and supplements to meet estimated kcal and protein needs and prevent further weight loss.

## 2018-09-09 NOTE — PROGRESS NOTES
Patient admitted to room 2019-02 from ED. Orders and database reviewed with patient. Bed mechanics in place with bedside table and call light in reach.

## 2018-09-09 NOTE — H&P
60 mg 2 times daily  2/22/18   Historical Provider, MD   carbidopa-levodopa (SINEMET)  MG per tablet Take 1 tablet by mouth daily     Historical Provider, MD   carbidopa-levodopa (SINEMET)  MG per tablet Take 0.5 tablets by mouth     Historical Provider, MD   Calcium Carbonate-Vitamin D (CALCIUM-VITAMIN D) 500-200 MG-UNIT per tablet Take 1 tablet by mouth 2 times daily (with meals)    Historical Provider, MD   carbidopa-levodopa (SINEMET)  MG per tablet Take 1 tablet by mouth 4 times daily     Historical Provider, MD   omeprazole (PRILOSEC) 20 MG capsule Take 20 mg by mouth daily     Historical Provider, MD        Allergies:     Oxytrol [oxybutynin] and Trazodone and nefazodone    Social History:     Tobacco:    reports that she has never smoked. She has never used smokeless tobacco.  Alcohol:      reports that she does not drink alcohol. Drug Use:  reports that she does not use drugs. Family History:     Family History   Problem Relation Age of Onset    Heart Disease Mother     High Blood Pressure Mother     Stroke Father        Review of Systems:     Positive and Negative as described in HPI. CONSTITUTIONAL:  Positive weight loss. negative for fevers, chills, sweats, fatigue  HEENT:  negative for vision, hearing changes, runny nose, throat pain  RESPIRATORY:  negative for shortness of breath, cough, congestion, wheezing. CARDIOVASCULAR:  negative for chest pain, palpitations.   GASTROINTESTINAL:  negative for nausea, vomiting, diarrhea, constipation, change in bowel habits, abdominal pain   GENITOURINARY:  negative for difficulty of urination, burning with urination, frequency   INTEGUMENT:  negative for rash, skin lesions, easy bruising   HEMATOLOGIC/LYMPHATIC:  negative for swelling/edema   ALLERGIC/IMMUNOLOGIC:  negative for urticaria , itching  ENDOCRINE:  negative increase in drinking, increase in urination, hot or cold intolerance  MUSCULOSKELETAL:  negative joint pains, muscle Imaging/Diagnostics:    Nothing new to report    Assessment :      Primary Problem  Dehydration    Active Hospital Problems    Diagnosis Date Noted    Dehydration [E86.0] 09/08/2018    Chronic fatigue [R53.82] 09/08/2018    General weakness [R53.1]     Anxiety [F41.9] 07/02/2018    Parkinson's disease ([facility] [address] [facility] 03/05/2018       Plan:     Patient status Admit as inpatient in the  Med/Surge    1. Gentle IV hydration  2. Continue amoxicillin by mouth  3. Continue home meds as appropriate  4. Follow labs  5. Follow vitals and assess for continued hypertension  6. General diet  7. PT and OT consult for treatment and discharge planning  8. Up with assist  9. Consult  and spiritual care for discharge needs and planning  10. Monitor intake and output  11. DVT and GI prophylaxis  12. Plan discussed with the patient    Consultations:   IP CONSULT TO INTERNAL MEDICINE     Patient is admitted as inpatient status because of co-morbidities listed above, severity of signs and symptoms as outlined, requirement for current medical therapies and most importantly because of direct risk to patient if care not provided in a hospital setting. MORIAH Meier CNP  9/8/2018  11:54 PM    Copy sent to Dr. Shae Grant primary care provider on file.

## 2018-09-09 NOTE — ED NOTES
Bed: 11  Expected date: 9/8/18  Expected time: 8:17 PM  Means of arrival: [facility]  Comments:  GABRIELLE Cheatham RN  09/08/18 2025

## 2018-09-09 NOTE — ED NOTES
Patient linens changed and patient nick cleaned and patient put in brief.   Patient incontinent     Ryan Hernandez RN  09/08/18 5674

## 2018-09-10 PROBLEM — E43 SEVERE PROTEIN-CALORIE MALNUTRITION (GOMEZ: LESS THAN 60% OF STANDARD WEIGHT) (HCC): Status: ACTIVE | Noted: 2018-01-01

## 2018-09-10 NOTE — PLAN OF CARE
Problem: Falls - Risk of:  Goal: Will remain free from falls  Will remain free from falls   Outcome: Ongoing  Siderails up x 2  Hourly rounding  Call light in reach  Instructed to call for assist before attempting out of bed. Remains free from falls and accidental injury at this time   Floor free from obstacles  Bed is locked and in lowest position  Adequate lighting provided  Bed alarm on, [place] star and Stay with Me signs posted          Problem: Risk for Impaired Skin Integrity  Goal: Tissue integrity - skin and mucous membranes  Structural intactness and normal physiological function of skin and  mucous membranes. Outcome: Ongoing      Problem: Pain:  Goal: Pain level will decrease  Pain level will decrease   Outcome: Ongoing  Pain level assessment complete.    Patient educated on pain scale and control interventions  PRN pain medication given per patient request  Patient instructed to call out with new onset of pain or unrelieved pain      Problem: Skin Integrity:  Goal: Will show no infection signs and symptoms  Will show no infection signs and symptoms   Outcome: Ongoing      Problem: Nutrition  Goal: Optimal nutrition therapy  Outcome: Ongoing

## 2018-09-10 NOTE — PROGRESS NOTES
vomiting, dysphagia. \"     ROS:  Constitutional: Negative for chills, diaphoresis, fever. Positive for fatigue and weight loss. HENT: Negative for Positive for weakness. ePositive for weakness and tremorar pain, hearing loss, nosebleeds, sore throat and tinnitus. Eyes: Negative for blurred vision, double vision, photophobia and pain. Respiratory: Negative for cough, hemoptysis, sputum production, shortness of breath and wheezing. Cardiovascular: Negative for palpitations, orthopnea, claudication, leg swelling and PND. Gastrointestinal: Negative for abdominal pain, blood in stool, constipation, diarrhea, heartburn, melena, nausea and vomiting. Genitourinary: Negative for dysuria, flank pain, frequency, hematuria and urgency. Musculoskeletal: Negative for back pain, falls, joint pain, myalgias and neck pain. Skin: Negative for itching and rash. Neurological: Negative for dizziness, tingling, sensory change, focal weakness, seizures and headaches. Endo/Heme/Allergies: Does not bruise/bleed easily. Psychiatric/Behavioral: Positive for depression. The patient is not nervous/anxious. Medications: Allergies:    Allergies   Allergen Reactions    Oxytrol [Oxybutynin]     Trazodone And Nefazodone        Current Meds:   labetalol (NORMODYNE;TRANDATE) injection 10 mg Q6H PRN   amoxicillin (AMOXIL) capsule 500 mg TID   clonazePAM (KLONOPIN) tablet 0.5 mg BID PRN   pantoprazole (PROTONIX) tablet 40 mg QAM AC   polyethylene glycol (GLYCOLAX) packet 17 g Daily   pyridostigmine (MESTINON) tablet 60 mg BID   sodium chloride flush 0.9 % injection 10 mL 2 times per day   sodium chloride flush 0.9 % injection 10 mL PRN   potassium chloride (KLOR-CON M) extended release tablet 40 mEq PRN   Or    potassium chloride 20 MEQ/15ML (10%) oral solution 40 mEq PRN   Or    potassium chloride 10 mEq/100 mL IVPB (Peripheral Line) PRN   magnesium sulfate 1 g in dextrose 5% 100 mL IVPB PRN   magnesium hydroxide (MILK OF MAGNESIA) 400 MG/5ML suspension 30 mL Daily PRN   bisacodyl (DULCOLAX) suppository 10 mg Daily PRN   nicotine (NICODERM CQ) 21 MG/24HR 1 patch Daily PRN   acetaminophen (TYLENOL) tablet 650 mg Q4H PRN   0.9 % sodium chloride infusion Continuous   enoxaparin (LOVENOX) injection 40 mg Daily   ondansetron (ZOFRAN-ODT) disintegrating tablet 4 mg Q6H PRN   Or    ondansetron (ZOFRAN) injection 4 mg Q6H PRN   calcium carb-cholecalciferol 600-400 MG-UNIT per tab 1 tablet BID WC   carbidopa-levodopa (SINEMET CR)  MG per extended release tablet 1 tablet 4x Daily   melatonin tablet 6 mg Nightly PRN       Data:     Code Status:  Full Code    Labs:    Hematology:  Recent Labs      09/08/18 2112 09/09/18   0620   WBC  4.6  4.7   RBC  3.84*  3.82*   HGB  12.3  12.6   HCT  36.7  35.8*   MCV  95.6  93.6   MCH  32.1  32.9   MCHC  33.6  35.1   RDW  14.6*  13.4   PLT  244  246   MPV  7.5  NOT REPORTED   INR   --   1.0     Chemistry:  Recent Labs      09/08/18 2112 09/09/18   0620   NA  138  141   K  3.7  3.6*   CL  101  103   CO2  24  28   GLUCOSE  108*  92   BUN  21  17   CREATININE  1.16*  1.07*   MG  1.9   --    ANIONGAP  13  10   LABGLOM  45*  50*   GFRAA  55*  >60   CALCIUM  9.0  9.1   TROPONINT  <0.03   --    MYOGLOBIN  86*   --      Recent Labs      09/08/18 2112 09/09/18   0620   LIPASE  296*  128*     POC Glucose:No results for input(s): POCGLU in the last 72 hours.     Physical Examination:    BP (!) 152/101   Pulse 102   Temp 97.5 °F (36.4 °C) (Oral)   Resp 16   Ht 5' 7\" (1.702 m)   Wt 126 lb 14.4 oz (57.6 kg)   SpO2 97%   BMI 19.88 kg/m²   Intake/Output Summary (Last 24 hours) at 09/10/18 1849  Last data filed at 09/10/18 0559   Gross per 24 hour   Intake             2355 ml   Output                0 ml   Net             2355 ml       General Appearance:    Alert, cooperative, no distress, appears stated age   Head:    Normocephalic, without obvious abnormality, atraumatic   Eyes:    PERRL, conjunctiva/corneas clear, EOM's intact        Ears:    Normal external ear canals, both ears   Nose:   Nares normal, septum midline, mucosa normal, no drainage    or sinus tenderness   Throat:   Lips, mucosa, and tongue normal   Neck:   Supple, symmetrical, trachea midline, no adenopathy;        thyroid:  No enlargement/tenderness/nodules; no carotid    bruit or JVD   Back:     Not examined at this time    Lungs:     Clear to auscultation bilaterally, respirations unlabored   Chest wall:    No tenderness or deformity   Heart:    Regular rate and rhythm, S1 and S2 normal, no murmur, rub   or gallop   Abdomen:     Soft, non-tender, bowel sounds active all four quadrants,     no masses, no organomegaly   Extremities:   Extremities normal, atraumatic, no cyanosis or edema   Pulses:   2+ and symmetric all extremities   Skin:   Skin color, texture, turgor normal, no rashes or lesions   Lymph nodes:   Cervical, supraclavicular, and axillary nodes normal   Neurologic:   CNII-XII intact       Assessment:     Primary Problem  Dehydration     Active Hospital Problems    Diagnosis Date Noted    Severe protein-calorie malnutrition Adrianna Clay: less than 60% of standard weight) ([facility] [E43] 09/10/2018    CKD (chronic kidney disease) stage 3, GFR 30-59 ml/min [N18.3] 09/09/2018    Severe malnutrition ([facility] [E43] 09/09/2018    Dehydration [E86.0] 09/08/2018    Chronic fatigue [R53.82] 09/08/2018    General weakness [R53.1]     Anxiety [F41.9] 07/02/2018    Parkinson's disease ([facility] 03/05/2018     Past Medical History:   Diagnosis Date    Hypertension     (orthostatic hypotension)    Parkinson disease ([facility]         Consultations:     IP CONSULT TO INTERNAL MEDICINE  IP CONSULT TO SPIRITUAL SERVICES  IP CONSULT TO SOCIAL WORK  IP CONSULT TO DIETITIAN    Plan:     1. Dietary consult  2. Social service consult  3. Physical therapy consult  4. Recheck laboratories in the morning  5.  Continue home medications for Parkinson's

## 2018-09-10 NOTE — CARE COORDINATION
Social work:  KIM and Jeanette/DC planner met with patient at bedside to complete dc assessment. Patient difficult to engage in conversation. Discussed SNF, patient is agreeable, states she would like to go to [address]. Referral sent.

## 2018-09-10 NOTE — CARE COORDINATION
Social work:  Pt accepted at [facility] at [address] and [facility] is pending. Pt informed and agreeable. Hens done.

## 2018-09-10 NOTE — PROGRESS NOTES
Occupational Therapy   Occupational Therapy Initial Assessment  Date: 9/10/2018   Patient Name: Lizzie Dyer  MRN: [id number]     : 1940    RN reports patient is medically stable for therapy treatment this date. Chart reviewed prior to treatment and patient is agreeable for therapy. All lines intact and patient positioned comfortably at end of treatment. All patient needs addressed prior to ending therapy session. Date of Service: 9/10/2018    Discharge Recommendations:  Subacute/Skilled Nursing Facility  OT Equipment Recommendations  Other: continue to assess as pt is a poor historian and unclear what DME/AE pt had PTA      Patient Diagnosis(es): The primary encounter diagnosis was Fatigue due to excessive exertion, initial encounter. Diagnoses of General weakness and Dehydration were also pertinent to this visit. has a past medical history of Hypertension and Parkinson disease ([facility] has no past surgical history on file. PER H&P: The patient is a 68 y.o. female that states she called 911 today from home. She said she interrupted her  watching a baseball game for her medications and he became very frustrated with her and told her he would no longer be her care giver. She said she got very upset and that her only option was to call 911 so she can get some help. She states she was not in physical danger but that she is losing weight, not getting her meds on time, and she feels that she is unable to care for herself. The patient was in the ER yesterday, she was given IV fluids, stated she felt better and refused admission at that time. Today she tells me she has become increasingly weaker and feels like she needs more help than she is getting at home. She denies chest pain, shortness of breath, fever, chills, nausea, and vomiting. She states a 20 pound weight loss in the last 2 years.  I spoke to the patient about nursing home placement and she said she would have to talk to her friends increased lethargy and confusion evident. Pt needs max arousals to open B eyes and minimal verbalizations noted. Edema: none   Balance  Sitting Balance: Minimal assistance (min to CG due to  lethargy and fatigue )  Standing Balance: Moderate assistance (with RW and mod to min assist; increased forward flexed posture )  Standing Balance  Time: < 1 min with max edu and encouragement needed   Activity: self care and mobility tasks   Sit to stand: Maximum assistance (max to mod assist )  Stand to sit: Maximum assistance  Functional Mobility  Functional - Mobility Device: Rolling Walker  Activity: To/from bathroom  Assist Level: Moderate assistance (mod to min assist with RW with increased time )  Functional Mobility Comments: max verbal instruction/tactile assist for upright posture/opening eyes and looking up to scan environment, RW safety/mgt and awareness/assist with IV pole   Toilet Transfers  Toilet - Technique: Ambulating  Equipment Used: Grab bars  Toilet Transfer: Moderate assistance  Toilet Transfers Comments: max verbal instruction/tactile assist for hand placement on grab bar, forward flexion at trunk/nose over toes, upright posture when up    ADL  Feeding: Minimal assistance (note poor appetite; per CNA pt with paranoia and feels her food is poisoned . Pt would not eat for therapist however would take sips of H20 with min assist needed for stabilization )  Grooming: Moderate assistance (pt made some attempts at combing hair seated in bedside chair )  UE Bathing: Moderate assistance  LE Bathing: Maximum assistance  UE Dressing:  Moderate assistance  LE Dressing: Maximum assistance  Toileting: Maximum assistance (with brief up and down and gown mgt; pt was unable to urinate with attempts at toileting )  Tone RUE  RUE Tone: Normotonic  Tone LUE  LUE Tone: Normotonic  Coordination  Movements Are Fluid And Coordinated: No  Coordination and Movement description: Fine motor impairments     Bed mobility  Supine to Sit: Unable to assess (pt up with CNA upon arrival )  Sit to Supine: Moderate assistance  Scooting: Moderate assistance (to scoot up towards [facility] )  Comment: with max verbal/tactile assist for hand placement, proper log rolling and breathing tech   Transfers  Stand Pivot Transfers: Maximum assistance (max to mod assist depending on surface and with use of RW )  Sit to stand: Maximum assistance (max to mod assist )  Stand to sit: Maximum assistance  Transfer Comments: max verbal instruction/tactile assist for B hand placement, forward flexion at trunk/nose over toes, upright posture when up, weight shifting, looking up and scanning environment, RW safety/mgt and awareness/assistw ith IV pole    Vision - Basic Assessment  Prior Vision: Wears glasses for distance only (per pt report )  Vision Comments: max arousals and verbal instruction to open B eyes; difficulties accurately assessing due to increased lethargy and cognitive deficits   Cognition  Overall Cognitive Status: Exceptions  Arousal/Alertness: Inconsistent responses to stimuli  Following Commands: Inconsistently follows commands; Follows one step commands with repetition; Follows one step commands with increased time  Attention Span: Difficulty attending to directions; Difficulty dividing attention  Memory: Decreased short term memory;Decreased recall of recent events  Safety Judgement: Decreased awareness of need for assistance;Decreased awareness of need for safety  Problem Solving: Assistance required to generate solutions;Assistance required to implement solutions;Assistance required to correct errors made;Assistance required to identify errors made;Decreased awareness of errors  Insights: Not aware of deficits  Initiation: Requires cues for all  Sequencing: Requires cues for all  Perception  Overall Perceptual Status: Impaired     Sensation  Overall Sensation Status: WFL        LUE AROM (degrees)  LUE AROM : WFL  RUE AROM (degrees)  RUE AROM : WFL  LUE Score                                           AM-PAC Score        AM-PAC Inpatient Daily Activity Raw Score: 14  AM-PAC Inpatient ADL T-Scale Score : 33.39  ADL Inpatient CMS 0-100% Score: 59.67  ADL Inpatient CMS G-Code Modifier : CK    Goals  Short term goals  Time Frame for Short term goals: By discharge, pt to demo   Short term goal 1: BUE strength increase by a 1/2 grade to asssit with self care tasks and complete BUE HEP with hand outs as needed with min assist.   Short term goal 2: UB ADL to set up and LB ADL to min assist with AD/AE as needed. Short term goal 3: ADL transfers and functional mob with AD as needed to min/CG. Short term goal 4: bed mob tasks with rail as needed to SBA. Short term goal 5: toileting tasks with BSC/grab bar and AD as needed to min assist.    Long term goals  Time Frame for Long term goals : balance to fair/fair minus with AD as needed and adriana for 5 mins to reduce LOB/falls.     Patient Goals   Patient goals : Pt unable to state due to increased lethargy and cognitive deficits        Therapy Time   Individual Concurrent Group Co-treatment   Time In 0820 (plus 10 min chart review )         Time Out 0856         Minutes [address], [place]

## 2018-09-10 NOTE — PROGRESS NOTES
Physical Therapy  Facility/Department: [facility] MED SURG  Daily Treatment Note  NAME: Jordyn Nichols  : 1940  MRN: [id number]    Date of Service: 9/10/2018    Discharge Recommendations:   [address]      Patient Diagnosis(es): The primary encounter diagnosis was Fatigue due to excessive exertion, initial encounter. Diagnoses of General weakness and Dehydration were also pertinent to this visit. has a past medical history of Hypertension and Parkinson disease ([address] has no past surgical history on file. Restrictions  Restrictions/Precautions  Restrictions/Precautions: Fall Risk  Position Activity Restriction  Other position/activity restrictions: LUE IV, confusion and cognitive deficits, poor safety awareness, increased lethargy, [place], bed and chair alarms   Subjective   General  Chart Reviewed: Yes  Response To Previous Treatment: Patient with no complaints from previous session. Family / Caregiver Present: No  Pain Screening  Patient Currently in Pain: Denies  Vital Signs  Patient Currently in Pain: Denies       Orientation  Orientation  Overall Orientation Status: Within Functional Limits  Objective   Bed mobility  Bridging: Minimal assistance  Rolling to Left: Minimal assistance  Rolling to Right: Minimal assistance  Sit to Supine: Moderate assistance  Scooting: Moderate assistance  Transfers  Sit to Stand: Minimal Assistance  Stand to sit: Minimal Assistance  Bed to Chair: Minimal assistance  Stand Pivot Transfers: Minimal Assistance  Ambulation  Ambulation?: Yes  Ambulation 1  Surface: level tile  Device: Rolling Walker  Assistance: Minimal assistance  Quality of Gait: fwd head and shldrs, short-shuffling steps with frequent start-stops  Distance: 25 ft x 1;   5 ft x 1. Comments: Posture correction cues and safety awareness.       Balance  Posture: Poor  Sitting - Static: Good  Sitting - Dynamic: Fair;+  Standing - Static: Fair  Standing - Dynamic: Fair;-  Exercises  Comments: Reviewed and performed in B LE seated leg program x 15 reps AROM in all planes with visual and verbal cues for proper technique. Pt declined closed kinetic chain or chair push up exercises due to fatigue. Assessment   Body structures, Functions, Activity limitations: Decreased functional mobility ; Decreased balance;Decreased coordination;Decreased endurance;Decreased strength;Decreased safe awareness  Assessment: Pt continues with generalized weakness, decreased safety and balance making patient high risk for falls. Pt very sweet and motivated for her recovery. Will progress as tolerated. Prognosis: Good  Activity Tolerance  Activity Tolerance: Patient limited by endurance     G-Code  PT G-Codes  Score: 14/24  Functional Limitation: Mobility: Walking and moving around  Mobility: Walking and Moving Around Current Status (): At least 60 percent but less than 80 percent impaired, limited or restricted  Mobility: Walking and Moving Around Goal Status (): 0 percent impaired, limited or restricted    AM-PAC Score  AM-PAC Inpatient Mobility Raw Score : 14  AM-PAC Inpatient T-Scale Score : 38.1  Mobility Inpatient CMS 0-100% Score: 61.29  Mobility Inpatient CMS G-Code Modifier : CL          Goals  Short term goals  Time Frame for Short term goals: 12 visits  Short term goal 1: Pt to be Arthur for bed mobility and transfers with imrpved safe sequencing. Short term goal 2: Pt to amb 150 feet with cane vs RW and SBA with improved upright posture. Short term goal 3: Pt to amb up/down 1 threshold step with LRAD with SBA if returns to home directly. Short term goal 4: Pt issued HEP and independnet wtih use. Patient Goals   Patient goals : Pt goal is to get strong again and walk on her own again without device.      Plan    Plan  Times per week: 1-2x/day for 5-6x/wk  Current Treatment Recommendations: Strengthening, Balance Training, Functional Mobility Training, Gait Training, Stair training, Transfer Training, Neuromuscular Re-education, Patient/Caregiver Education & Training  Safety Devices  Type of devices:  All fall risk precautions in place, Patient at risk for falls, Gait belt, Nurse notified, Bed alarm in place, Left in bed  Restraints  Initially in place: No     Therapy Time   Individual Concurrent Group Co-treatment   Time In 1618         Time Out 1649         Minutes Sofia 9, PT

## 2018-09-11 NOTE — PROGRESS NOTES
Physical Therapy  Facility/Department: [facility] MED SURG  Daily Treatment Note  NAME: Mars Courtney  : 1940  MRN: [id number]    Date of Service: 2018    Discharge Recommendations:  [address]      Patient Diagnosis(es): The primary encounter diagnosis was Fatigue due to excessive exertion, initial encounter. Diagnoses of General weakness and Dehydration were also pertinent to this visit. has a past medical history of Hypertension and Parkinson disease ([address] has no past surgical history on file. Restrictions  Restrictions/Precautions  Restrictions/Precautions: Fall Risk  Position Activity Restriction  Other position/activity restrictions: LUE IV, confusion and cognitive deficits, poor safety awareness, increased lethargy, [place], bed and chair alarms; Pt somnulent this am.   Subjective   General  Chart Reviewed: Yes  Response To Previous Treatment: Patient with no complaints from previous session. Family / Caregiver Present: No  Pain Screening  Patient Currently in Pain: Unable to Assess  Vital Signs  Patient Currently in Pain: Unable to Assess       Orientation  Orientation  Overall Orientation Status:  (Pt unable to be aroused.)  Objective   Bed mobility  Bridging: Dependent/Total  Rolling to Left: Dependent/Total  Rolling to Right: Dependent/Total      Exercises  Comments: attempted arousing patient for pressure relief, exercises, and mobiltiy. Pt just given ativan and unable to arouse. Comment: Pt repositioned Lt side/ right side/ and then supine with air mattress inflated. Pt unable to be arouse but meds on board and unsure of when they will wear off. Pt positioned safely at the end of treatment with all high risk areas propped. Assessment   Assessment: Pt became agitated last pm and sleepless so pt issued ativan and now is unable to arouse for therapy. Pt repositioned and propped appropriately.    Prognosis: Good  Patient Education: PT POC and LE ther ex

## 2018-09-11 NOTE — CARE COORDINATION
Social work:  Noted [id number] completed and denied overturned with approval for 1 week of skilled therapy. KIM spoke with Fadia and she will pass the information to her . I also told Nimisha Herman psych evaluation ordered.

## 2018-09-11 NOTE — PROGRESS NOTES
Physical Therapy  Facility/Department: [facility] MED SURG  Daily Treatment Note  NAME: Dontrell Isbell  : 1940  MRN: [id number]    Date of Service: 2018    Discharge Recommendations:  Subacute/Skilled Nursing Facility   PT Equipment Recommendations  Equipment Needed: No    Patient Diagnosis(es): The primary encounter diagnosis was Fatigue due to excessive exertion, initial encounter. Diagnoses of General weakness and Dehydration were also pertinent to this visit. has a past medical history of Hypertension and Parkinson disease ([facility] has no past surgical history on file. Restrictions  Restrictions/Precautions  Restrictions/Precautions: Fall Risk  Position Activity Restriction  Other position/activity restrictions: LUE IV, confusion and cognitive deficits, poor safety awareness, increased lethargy, [place], bed and chair alarms. Subjective   General  Chart Reviewed: Yes  Response To Previous Treatment: Patient with no complaints from previous session. Family / Caregiver Present: No  Pain Screening  Patient Currently in Pain: Denies  Vital Signs  Patient Currently in Pain: Denies  Patient Observation  Observations: Pt in bed upon arrival       Orientation  Orientation  Overall Orientation Status: Impaired  Orientation Level: Disoriented to place; Disoriented to time;Disoriented to situation  Objective   Bed mobility  Bridging: Maximum assistance (x2 A)  Rolling: Mod assist x 2 A           Balance  Posture: Poor  Sitting - Static: Good  Sitting - Dynamic: Fair;+  Standing - Static: Fair  Standing - Dynamic: Fair;-  Exercises  Comments: AROM/ AAROM x 4 extremities   Other exercises  Other exercises?: Yes (Rolling to L and R)              Assessment   Body structures, Functions, Activity limitations: Decreased functional mobility ; Decreased balance;Decreased coordination;Decreased endurance;Decreased strength;Decreased safe awareness  Assessment: Pt still appears disoriented this session.  Pt less drowsy than

## 2018-09-11 NOTE — PROGRESS NOTES
ANTIMICROBIAL STEWARDSHIP  Upon review of the patient's chart, the committee has the following recommendation for your consideration:     Patient is receiving amoxicillin without an indication. Please discontinue it or document an indication with s/sx. Temp Readings from Last 3 Encounters:   09/11/18 98.6 °F (37 °C) (Oral)   09/07/18 98.2 °F (36.8 °C) (Oral)   07/11/18 98 °F (36.7 °C) (Oral)        Recent Labs      09/09/18   0620  09/11/18   0623   WBC  4.7  6.6         Thank you. Rosemary Ball, PharmD  9/11/2018  10:46 AM    The Antimicrobial Stewardship Committee at [facility] is led by  Vladimir Anna MD, Infectious Diseases Section Chair.

## 2018-09-12 PROBLEM — F32.9 REACTIVE DEPRESSION: Status: ACTIVE | Noted: 2018-01-01

## 2018-09-12 NOTE — PROGRESS NOTES
Transitions of Care Pharmacy Service   Medication Review    The patient's list of current home medications has been reviewed. Source(s) of information: Patient/[facility]/PCP/Nursing home ([facility] Valeria Spence)    Based on information provided by the above source(s), I have updated the patient's home med list as described below. Please review the ACTION REQUESTED BY PHYSICIAN section of this note below for any discrepancies on current hospital orders. I changed or updated the following medications on the patient's home medication list:  Discontinued · Amoxicillin- course complete  · Calcium Carbonate- medication changed  · Cephalexin- course complete     Added · Melatonin 10 mg tablets nightly   · Vitamin D 1000 unit tablets daily  · Carvedilol 6.25 mg tablets twice daily     Adjusted   · Patient takes Sinemet 25/100 mg ER 1 tablet four times daily   · Patient takes Sinemet 25/100 mg IR 1.5 tablets three times daily. · Clonazepam- patient takes 0.25 mg twice daily as needed   Other Notes · Patient is poor historian with unstable home life. Medication list compiled from recent PCP visit, nursing home discharge list, and [facility] fill records. · Rotigotine patch sent to [facility] but never filled. PHYSICIAN ACTION REQUESTED  Discrepancies on current hospital orders that need to be addressed by a physician:    Medication Action Requested   Sinemet  Consider adding 25/100 mg IR 1.5 tablets three times daily    Pyridostigmine 60 mg tablet Change from BID to TID   Carvedilol 6.25 mg tablet Consider adding twice daily             Please feel free to call me with any questions about this encounter. Thank you.     Debra Kirk PharmD  Transitions of Care Pharmacy Service  Phone:  [phone]  Fax: [phone]      Electronically signed by Debra Kirk, [address] [place] [address] on 9/12/2018 at 3:55 PM       Prescriptions Prior to Admission: carbidopa-levodopa (SINEMET CR)  MG per extended release tablet, Take

## 2018-09-12 NOTE — PROGRESS NOTES
[facility] - Progress Note    2018   5:12 PM    Name:  Stevie Lopez  :    1940  Age:  68 y.o. female   MRN:    [id number]     Acct:     [de-identified]   Room:     Day: Chidi Real 149: Sravanthi Oliveira Date: 2018  8:25 PM  PCP: No primary care provider on file. Subjective:     C/C:   Chief Complaint   Patient presents with    Fatigue       Interval History: Status: improved. She appears alert but at times is confused. Generally the patient is not eating. Clinically she appears depressed. Psychiatry consultation has been obtained. They're recommending Zoloft 25 mg to begin in the morning to be titrated as needed at 2 week intervals. Pharmacy has clarified her home medications and they have been adjusted accordingly. Her blood pressure was not controlled and she received 1 dose of lisinopril 5 mg and Norvasc 5 mg. It appears that she should be on Coreg 6.25 mg twice a day but is noncompliant. This has been ordered. The Norvasc is discontinued and her lisinopril 5 mg will be continued. She follows neurology at the [facility]. She was recently in rehab in [facility] but states she did not eat there because she did not like the food. She is drinking all her fluids but still not eating much in the way of solid foods. Dietary consultation has been obtained. PT and OT are both recommending rehab. She'll be started on Megace 200 mg daily. She has been accepted at [facility] and will be transferred there today. History: \" 67 yo f with h/o parkinson's disease was brought to ED due to generalized weakness.      Patient states her generalized weakness has been going on for about year, but continue to be worsening. Patient unable to take care of her self, and as her  does not wish to or cannot take care of the patient any more. Patient called 911 and was brought to ED. Patient was in ED 2 days ago for the same reason.  Patient was given IVF and 09/08/2018    Chronic fatigue [R53.82] 09/08/2018    General weakness [R53.1]     Anxiety [F41.9] 07/02/2018    Parkinson's disease ([facility] Aleksandr Matters 03/05/2018     Past Medical History:   Diagnosis Date    Hypertension     (orthostatic hypotension)    Parkinson disease ([facility]         Consultations:     IP CONSULT TO INTERNAL MEDICINE  IP CONSULT TO SPIRITUAL SERVICES  IP CONSULT TO SOCIAL WORK  IP CONSULT TO DIETITIAN  IP CONSULT TO PSYCHIATRY    Plan:     1. Megace 200 mg daily  2. Start Zoloft 25 mg daily and titrate as needed every 2 weeks  3. Resume Coreg 6.25 mg twice a day  4. Add lisinopril 5 mg daily  5. Transfer to SNF today  6. Encourage/help with feeding  7.  Continue DVT and GI prophylaxis      Electronically signed by Naila Canales DO on 9/12/2018 at 5:12 PM

## 2018-09-12 NOTE — PLAN OF CARE
Problem: Falls - Risk of:  Goal: Will remain free from falls  Will remain free from falls   Outcome: Ongoing    Goal: Absence of physical injury  Absence of physical injury   Outcome: Ongoing      Problem: Risk for Impaired Skin Integrity  Goal: Tissue integrity - skin and mucous membranes  Structural intactness and normal physiological function of skin and  mucous membranes.    Outcome: Ongoing      Problem: Pain:  Goal: Pain level will decrease  Pain level will decrease   Outcome: Ongoing      Problem: Skin Integrity:  Goal: Will show no infection signs and symptoms  Will show no infection signs and symptoms   Outcome: Ongoing    Goal: Absence of new skin breakdown  Absence of new skin breakdown   Outcome: Ongoing

## 2018-09-12 NOTE — DISCHARGE SUMMARY
105 09/11/2018    CO2 27 09/11/2018    BUN 11 09/11/2018    CREATININE 1.15 09/11/2018    ANIONGAP 13 09/11/2018    ALKPHOS 104 04/27/2016    ALT 9 04/27/2016    AST 33 04/27/2016    BILITOT 1.39 04/27/2016    LABALBU 4.2 04/27/2016    ALBUMIN 1.6 04/27/2016    LABGLOM 46 09/11/2018    GFRAA 55 09/11/2018    GFR      09/11/2018    GFR NOT REPORTED 09/11/2018    PROT 6.9 04/27/2016    CALCIUM 9.6 09/11/2018     PT/INR:  No results found for: PTINR  PTT: No results found for: APTT  FLP:    Lab Results   Component Value Date    CHOL 129 04/27/2016    TRIG 121 04/27/2016    HDL 50 04/27/2016     U/A:    Lab Results   Component Value Date    COLORU YELLOW 09/07/2018    TURBIDITY CLEAR 09/07/2018    SPECGRAV 1.025 09/07/2018    HGBUR NEGATIVE 09/07/2018    PHUR 6.5 09/07/2018    PROTEINU TRACE 09/07/2018    GLUCOSEU NEGATIVE 09/07/2018    KETUA TRACE 09/07/2018    BILIRUBINUR NEGATIVE 09/07/2018    BILIRUBINUR small 06/08/2018    UROBILINOGEN Normal 09/07/2018    NITRU NEGATIVE 09/07/2018    LEUKOCYTESUR NEGATIVE 09/07/2018     TSH:    Lab Results   Component Value Date    TSH 2.43 07/23/2018       Radiology:    No results found. Consultations:    Consults:     Final Specialist Recommendations/Findings:   IP CONSULT TO INTERNAL MEDICINE  IP CONSULT TO SPIRITUAL SERVICES  IP CONSULT TO SOCIAL WORK  IP CONSULT TO DIETITIAN  IP CONSULT TO PSYCHIATRY      The patient was seen and examined on day of discharge and this discharge summary is in conjunction with any daily progress note from day of discharge. Discharge plan:     Disposition: [address]    Physician Follow Up:     No follow-up provider specified. Requiring Further Evaluation/Follow Up POST HOSPITALIZATION/Incidental Findings:   1. Megace 200 mg daily  2. Start Zoloft 25 mg daily and titrate as needed every 2 weeks  3. Resume Coreg 6.25 mg twice a day  4. Add lisinopril 5 mg daily  5. Transfer to SNF today  6.  Encourage/help with

## 2018-09-12 NOTE — CONSULTS
suspension 30 mL, 30 mL, Oral, Daily PRN  bisacodyl (DULCOLAX) suppository 10 mg, 10 mg, Rectal, Daily PRN  nicotine (NICODERM CQ) 21 MG/24HR 1 patch, 1 patch, Transdermal, Daily PRN  acetaminophen (TYLENOL) tablet 650 mg, 650 mg, Oral, Q4H PRN  0.9 % sodium chloride infusion, , Intravenous, Continuous  enoxaparin (LOVENOX) injection 40 mg, 40 mg, Subcutaneous, Daily  ondansetron (ZOFRAN-ODT) disintegrating tablet 4 mg, 4 mg, Oral, Q6H PRN **OR** ondansetron (ZOFRAN) injection 4 mg, 4 mg, Intravenous, Q6H PRN  calcium carb-cholecalciferol 600-400 MG-UNIT per tab 1 tablet, 1 tablet, Oral, BID WC  carbidopa-levodopa (SINEMET CR)  MG per extended release tablet 1 tablet, 1 tablet, Oral, 4x Daily  melatonin tablet 6 mg, 6 mg, Oral, Nightly PRN       PAST PSYCHIATRIC HISTORY:  May have been treated for depression     Past psychiatric medications include:   Unable to remember    Adverse reactions from psychotropic medications:  Unable to remember but trazodone and nefazodone are listed as allergies    Lifetime Psychiatric Review of Systems:     Laurita: GASPER  Panic: has required klonopin and ativan during stay  Phobia: denies  Hallucinations: denies  Delusions: denies     Past Medical History:        Diagnosis Date    Hypertension     (orthostatic hypotension)    Parkinson disease ([facility]        Past Surgical History:    History reviewed. No pertinent surgical history.     Allergies: Oxytrol [oxybutynin] and Trazodone and nefazodone      Social History:     x42 years, states  is refusing to care for her anymore  Social History     Social History    Marital status:      Spouse name: N/A    Number of children: N/A    Years of education: N/A     Social History Main Topics    Smoking status: Never Smoker    Smokeless tobacco: Never Used    Alcohol use No    Drug use: No    Sexual activity: Not Asked     Other Topics Concern    None     Social History Narrative    None       Family Psychiatric

## 2018-09-12 NOTE — PLAN OF CARE
Problem: Falls - Risk of:  Goal: Absence of physical injury  Absence of physical injury   Outcome: Ongoing      Problem: Risk for Impaired Skin Integrity  Goal: Tissue integrity - skin and mucous membranes  Structural intactness and normal physiological function of skin and  mucous membranes.    Outcome: Ongoing      Problem: Pain:  Goal: Pain level will decrease  Pain level will decrease   Outcome: Ongoing

## 2018-09-12 NOTE — PROGRESS NOTES
Physical Therapy  Facility/Department: [facility]  Daily Treatment Note  NAME: Malena Hairston  : 1940  MRN: [id number]    Date of Service: 2018    Discharge Recommendations:  Subacute/Skilled Nursing Facility   PT Equipment Recommendations  Equipment Needed: No    Patient Diagnosis(es): The primary encounter diagnosis was Fatigue due to excessive exertion, initial encounter. Diagnoses of General weakness and Dehydration were also pertinent to this visit. has a past medical history of Hypertension and Parkinson disease ([facility] has no past surgical history on file. Restrictions  Restrictions/Precautions  Restrictions/Precautions: Fall Risk  Position Activity Restriction  Other position/activity restrictions: LUE IV, confusion and cognitive deficits, poor safety awareness, increased lethargy, Hopi, bed and chair alarms. Subjective   General  Chart Reviewed: Yes  Response To Previous Treatment: Patient with no complaints from previous session. Family / Caregiver Present: No  Subjective  Subjective: Patient agreeable for PT treatment. General Comment  Comments: RNJolanta reports patient is medically stable for PT treatment but she did get report that patient was throwing objects around the room. Pain Screening  Patient Currently in Pain: Denies  Pain Assessment  Pain Assessment: 0-10  Pain Level: 0  Vital Signs  Patient Currently in Pain: Denies  Oxygen Therapy  O2 Device: None (Room air)       Objective   Bed mobility  Bridging: Maximum assistance  Scooting: Maximal assistance  Transfers  Sit to Stand: Minimal Assistance  Stand to sit: Minimal Assistance  Ambulation  Ambulation?: Yes  Ambulation 1  Surface: level tile  Device: Rolling Walker  Assistance: Moderate assistance  Quality of Gait: fwd head and shldrs, short-shuffling steps with frequent start-stops  Distance: 4 steps to the [facility]  Comments: Posture correction cues and safety awareness.    Stairs/Curb  Stairs?: No     Balance  Posture: Poor  Sitting - Static: Good  Sitting - Dynamic: Fair;+  Standing - Static: Fair  Standing - Dynamic: Fair;-  Exercises  Comments: AROM/ AAROM x 4 extremities   Other exercises  Other exercises?: No   Comment: Patient rolled several time for brief and bed change w/ bed flat and required use of bed railing and repetitive one step commands. Rolling Lt and Rt Mod A. Assessment   Body structures, Functions, Activity limitations: Decreased functional mobility ; Decreased balance;Decreased coordination;Decreased endurance;Decreased strength;Decreased safe awareness  Assessment: Patient following commands but seems confused, only able to follow several one step directions. Patient is appropriate for SNF discharge due to confusion and requires Max/Mod A for all funtional and bed mobility. Prognosis: Good  Activity Tolerance  Activity Tolerance: Patient limited by cognitive status; Patient limited by endurance;Treatment limited secondary to medical complications (free text)   OutComes Score    AM-PAC Score  AM-PAC Inpatient Mobility Raw Score : 10  AM-PAC Inpatient T-Scale Score : 32.29  Mobility Inpatient CMS 0-100% Score: 76.75  Mobility Inpatient CMS G-Code Modifier : CL          Goals  Short term goals  Time Frame for Short term goals: 12 visits  Short term goal 1: Pt to be Arthur for bed mobility and transfers with imrpved safe sequencing. Short term goal 2: Pt to amb 150 feet with cane vs RW and SBA with improved upright posture. Short term goal 3: Pt to amb up/down 1 threshold step with LRAD with SBA if returns to home directly. Short term goal 4: Pt issued HEP and independnet wtih use. Patient Goals   Patient goals : Pt goal is to get strong again and walk on her own again without device.      Plan    Plan  Times per week: 1-2x/day for 5-6x/wk  Current Treatment Recommendations: Strengthening, Balance Training, Functional Mobility Training, Gait Training, Stair training, Transfer Training, Neuromuscular

## 2018-10-08 PROBLEM — E86.0 DEHYDRATION: Status: RESOLVED | Noted: 2018-01-01 | Resolved: 2018-01-01
